# Patient Record
Sex: FEMALE | Race: WHITE | Employment: UNEMPLOYED | URBAN - METROPOLITAN AREA
[De-identification: names, ages, dates, MRNs, and addresses within clinical notes are randomized per-mention and may not be internally consistent; named-entity substitution may affect disease eponyms.]

---

## 2024-04-02 ENCOUNTER — OFFICE VISIT (OUTPATIENT)
Dept: URGENT CARE | Facility: CLINIC | Age: 39
End: 2024-04-02
Payer: COMMERCIAL

## 2024-04-02 VITALS
RESPIRATION RATE: 16 BRPM | HEART RATE: 80 BPM | DIASTOLIC BLOOD PRESSURE: 60 MMHG | OXYGEN SATURATION: 98 % | SYSTOLIC BLOOD PRESSURE: 112 MMHG | TEMPERATURE: 97.9 F | HEIGHT: 60 IN

## 2024-04-02 DIAGNOSIS — Z02.4 DRIVER'S PERMIT PHYSICAL EXAMINATION: Primary | ICD-10-CM

## 2024-04-02 NOTE — PROGRESS NOTES
[facility]        NAME: Celina Dixon is a 38 y.o. female  : 1985    MRN: [id number]  DATE: 2024  TIME: 2:16 PM    Assessment and Plan   's permit physical examination [Z02.4]  1. 's permit physical examination        It is my medical option that patient has no history of or evidence of chronic illnesses that would limit their ability to drive a non-commercial vehicle safely.   Patient is instructed that they must wear corrective lenses whenever driving as they were worn for their exam today.   [employer] paperwork was filled out to this effect and originals were returned to the patient.           Patient Instructions   Drive Safely and Carefully and Wear your glasses!    Chief Complaint     Chief Complaint   Patient presents with    divers permit     Annual exam         History of Present Illness       38 year old female presents for  licensing physical. Pt reports they have never driven in the [facility] or any other state. Pt reports they are healthy and denies history of neurologic, neuropsychiatric, and cardiac disease. Pt denies further history of hypertension, uncontrolled epilepsy, uncontrolled diabetes, lapses in consciousness, cognitive impairments, alcohol abuse, and illicit drug use. Pt reports they have all appendages and denies use of prosthetic devices.          Review of Systems   Review of Systems   Neurological:  Negative for dizziness, seizures, syncope, weakness, light-headedness, numbness and headaches.         Current Medications     No current outpatient medications on file.    Current Allergies     Allergies as of 2024    (No Known Allergies)            The following portions of the patient's history were reviewed and updated as appropriate: allergies, current medications, past family history, past medical history, past social history, past surgical history and problem list.     History reviewed. No pertinent past medical  "history.    No past surgical history on file.    No family history on file.      Medications have been verified.        Objective   /60   Pulse 80   Temp 97.9 °F (36.6 °C)   Resp 16   Ht 5' 0.24\" (1.53 m)   SpO2 98%   No LMP recorded.       Physical Exam     Physical Exam  Vitals and nursing note reviewed.   Constitutional:       General: She is awake. She is not in acute distress.     Appearance: Normal appearance. She is well-developed and well-groomed. She is not ill-appearing, toxic-appearing or diaphoretic.   HENT:      Head: Normocephalic and atraumatic.      Right Ear: Hearing, tympanic membrane, ear canal and external ear normal.      Left Ear: Hearing, tympanic membrane, ear canal and external ear normal.      Nose: Nose normal.      Mouth/Throat:      Lips: Pink. No lesions.      Mouth: Mucous membranes are moist. No injury, lacerations or oral lesions.      Dentition: Normal dentition. No dental tenderness, gingival swelling, dental caries, dental abscesses or gum lesions.      Tongue: No lesions. Tongue does not deviate from midline.      Palate: No mass and lesions.      Pharynx: No pharyngeal swelling, oropharyngeal exudate, posterior oropharyngeal erythema or uvula swelling.      Tonsils: No tonsillar exudate.   Eyes:      General: Lids are normal. Vision grossly intact. Gaze aligned appropriately. No visual field deficit or scleral icterus.        Right eye: No foreign body, discharge or hordeolum.         Left eye: No foreign body, discharge or hordeolum.      Extraocular Movements: Extraocular movements intact.      Conjunctiva/sclera: Conjunctivae normal.      Pupils: Pupils are equal, round, and reactive to light. Pupils are equal.      Right eye: Pupil is round, reactive and not sluggish.      Left eye: Pupil is round, reactive and not sluggish.      Funduscopic exam:     Right eye: No hemorrhage, exudate, AV nicking, arteriolar narrowing or papilledema. Red reflex and venous " pulsations present.         Left eye: No hemorrhage, exudate, AV nicking, arteriolar narrowing or papilledema. Red reflex and venous pulsations present.     Visual Fields: Right eye visual fields normal and left eye visual fields normal.      Comments: Left eye vision:20/20  Right eye vision:20/20  Both eye vision: 20/15  CORRECTED    Neck:      Thyroid: No thyroid mass or thyromegaly.      Vascular: No carotid bruit.   Cardiovascular:      Rate and Rhythm: Normal rate and regular rhythm.      Pulses:           Carotid pulses are 2+ on the right side and 2+ on the left side.       Radial pulses are 2+ on the right side and 2+ on the left side.        Posterior tibial pulses are 2+ on the right side and 2+ on the left side.      Heart sounds: S1 normal and S2 normal. Heart sounds not distant. No murmur heard.     No friction rub. No gallop.   Pulmonary:      Effort: Pulmonary effort is normal.      Breath sounds: Normal breath sounds and air entry. No stridor, decreased air movement or transmitted upper airway sounds. No decreased breath sounds, wheezing, rhonchi or rales.   Abdominal:      General: Abdomen is flat. Bowel sounds are normal.      Palpations: Abdomen is soft.      Tenderness: There is no abdominal tenderness.   Musculoskeletal:      Cervical back: Normal range of motion and neck supple.      Right lower leg: No edema.      Left lower leg: No edema.      Right foot: Normal range of motion.      Left foot: Normal range of motion.      Comments: Pt has full ROM about all UE and LE joints.  Strength is 5/5 BUE and BLE   Lymphadenopathy:      Cervical: No cervical adenopathy.   Skin:     General: Skin is warm and dry.   Neurological:      General: No focal deficit present.      Mental Status: She is alert and oriented to person, place, and time.      Sensory: Sensation is intact.      Motor: Motor function is intact.      Coordination: Coordination is intact.      Gait: Gait is intact.      Deep Tendon  "Reflexes: Reflexes are normal and symmetric.   Psychiatric:         Attention and Perception: Attention and perception normal.         Mood and Affect: Mood and affect normal.         Speech: Speech normal.         Behavior: Behavior normal. Behavior is cooperative.         Thought Content: Thought content normal.         Judgment: Judgment normal.               Note: Portions of this record may have been created with voice recognition software. Occasional wrong word or \"sound a like\" substitutions may have occurred due to the inherent limitations of voice recognition software. Please read the chart carefully and recognize, using context, where substitutions have occurred.*      "

## 2024-09-25 NOTE — PROGRESS NOTES
Assessment/Plan:  Problem List Items Addressed This Visit          Cardiovascular and Mediastinum    Migraines     Uncontrolled.  Start Prozac 20mg QD and Compazine 10mg 1/2-1 tab q6 hours PRN.      For headache and migraine prevention I would suggest a combination vitamin supplement which may include 1 or more of the following ingredients: Magnesium citrate 300 mg twice a day, Riboflavin (vitamin B2) 400 - 600 mg,  Butterbur 150 mg (PA free). Other ingredients that may be helpful are feverfew, coenzyme Q10 100 mg three times a day,  melatonin and kelly.  Some example brands that combine some of these ingredients are Migravent or Dolovent.              Relevant Medications    FLUoxetine (PROzac) 10 mg capsule    prochlorperazine (COMPAZINE) 10 mg tablet    Other Relevant Orders    Magnesium       Behavioral Health    Adjustment disorder with depressed mood     Stable.  Start Prozac 20mg QD.         Relevant Medications    FLUoxetine (PROzac) 10 mg capsule    prochlorperazine (COMPAZINE) 10 mg tablet       Other    Overweight     Recommend lifestyle modifications.           Other Visit Diagnoses       Annual physical exam    -  Primary    Health Maintenance   Topic Date Due    Hepatitis C Screening  Never done    HIV Screening  Never done    Annual Physical  Never done    Cervical Cancer Screening  Never done    Influenza Vaccine (1) 09/01/2024    COVID-19 Vaccine (1 - 2023-24 season) 12/26/2024 (Originally 9/1/2024)    DTaP,Tdap,and Td Vaccines (1 - Tdap) 09/26/2025 (Originally 6/15/2007)    Depression Screening  09/26/2025    Zoster Vaccine (1 of 2) 06/15/2036    RSV Vaccine Age 60+ Years (1 - 1-dose 60+ series) 06/15/2046    RSV Vaccine age 0-20 Months  Aged Out    Pneumococcal Vaccine: Pediatrics (0 to 5 Years) and At-Risk Patients (6 to 64 Years)  Aged Out    HIB Vaccine  Aged Out    IPV Vaccine  Aged Out    Hepatitis A Vaccine  Aged Out    Meningococcal ACWY Vaccine  Aged Out    HPV Vaccine  Aged Out          Multiple nevi        Relevant Orders    Ambulatory Referral to Dermatology    Screening for cervical cancer        Relevant Orders    Ambulatory Referral to Obstetrics / Gynecology    Screening for cardiovascular condition        Relevant Orders    CBC and differential    Comprehensive metabolic panel    Lipid panel    LDL cholesterol, direct    Screening for HIV (human immunodeficiency virus)        Relevant Orders    HIV 1/2 AB/AG w Reflex [facility] for 2 yr old and above    Need for hepatitis C screening test        Relevant Orders    Hepatitis C antibody    BMI 26.0-26.9,adult        Skin cancer screening        Relevant Orders    Ambulatory Referral to Dermatology    Papules        Relevant Orders    Ambulatory Referral to Dermatology    Encounter for initial prescription of contraceptive pills        Relevant Orders    Ambulatory Referral to Obstetrics / Gynecology    Other fatigue        Relevant Orders    CBC and differential    Comprehensive metabolic panel    TSH, 3rd generation with Free T4 reflex    Magnesium    Myalgia        Relevant Orders    Vitamin D 25 hydroxy             Return in about 6 weeks (around 11/7/2024) for 40min Sp Int - F/U Migraine, Adjust D/O, Labs.      Future Appointments   Date Time Provider Department Center   11/12/2024  3:00 PM Hanane Dunn DO [facility]          Subjective:     Celina is a 39 y.o. female who presents today as a new patient for her medical conditions.      New Patient    Previous PCP:  None  Reason for Transfer:  [place] in [place]  Last seen by previous PCP:  6/2022 in [place]  Last Labs:  None  Last Physical:  Years  Medical Records Requested:   No      HPI:  Chief Complaint   Patient presents with    New Patient Visit     Here to establish care and address concerns regarding migraines and some moles on her legs.     Headache     Pt c/o frequent migraines, approx 3x week. Experiencing n/v, light and sound sensitivity. Pt uses excedrin  migraine with temporary relief of headache. Tx for migraines in the past in [facility], but it has been a few years.     HM     Declines Tdap today, unknown last dose. Last pap was approx 3 years ago, in [facility].      -- Above per clinical staff and reviewed. --      HPI      Today:      Medical  [id number] ([id number]) - disconnected.  2nd  used - Renae Key ([id number])    Controlled Substance Review    [place] PDMP or [place]  reviewed: No red flags were identified; safe to proceed with prescription.    Overweight - Trying to watch diet.  No exercise.      Migraine s aura - Symptoms x years, worsening in the past 3 years.  B/L frontal, temporal, and crown.  Occurs 3 times per weekly.  Sometimes migraine last 1-3 days.  She has 7 migraine days per month.  +Nausea, vomiting, photophobia, phonophobia.  Using Excedrin migraine 3 days per week PRN.  Previously on daily unknown Rx from [place] years ago.  No Neuro consult or brain imaging previously.      Nevi - Symptoms x 1 year, changing in size and color.  On legs.  +Itching.  Denies  bleeding, scabbing.      Adjustment Disorder c Depression - Symptoms x 2 months.  Feels mood is stable.  Feels tired.  Good social supports.  No SI/HI/AH/VH.  No mood meds or counseling previously.      PHQ-2/9 Depression Screening    Little interest or pleasure in doing things: 1 - several days  Feeling down, depressed, or hopeless: 1 - several days  Trouble falling or staying asleep, or sleeping too much: 2 - more than half the days  Feeling tired or having little energy: 2 - more than half the days  Poor appetite or overeatin - not at all  Feeling bad about yourself - or that you are a failure or have let yourself or your family down: 0 - not at all  Trouble concentrating on things, such as reading the newspaper or watching television: 0 - not at all  Moving or speaking so slowly that other people could have noticed. Or the opposite - being so fidgety or restless  "that you have been moving around a lot more than usual: 0 - not at all  Thoughts that you would be better off dead, or of hurting yourself in some way: 0 - not at all  PHQ-2 Score: 2  PHQ-2 Interpretation: Negative depression screen  PHQ-9 Score: 6  PHQ-9 Interpretation: Mild depression             Reviewed:  Labs - None    No Gyn.  She is taking Grisel OCP from [place], but it causes nausea and vomiting.    Sees Dentist q6 months.  Sees Optho q2 years.            The following portions of the patient's history were reviewed and updated as appropriate: allergies, current medications, past family history, past medical history, past social history, past surgical history and problem list.      Review of Systems   Constitutional:  Positive for fatigue. Negative for appetite change, chills, diaphoresis and fever.   Respiratory:  Negative for chest tightness and shortness of breath.    Cardiovascular:  Negative for chest pain.   Gastrointestinal:  Negative for abdominal pain, blood in stool, diarrhea, nausea and vomiting.   Genitourinary:  Negative for dysuria.        Current Outpatient Medications   Medication Sig Dispense Refill    drospirenone-ethinyl estradiol (GRISEL) 3-0.03 MG per tablet Take 1 tablet by mouth daily Rx per [place]      FLUoxetine (PROzac) 10 mg capsule 1 tab by mouth daily in AM x 1 week, then increase to 2 tabs by mouth daily in AM. 60 capsule 1    prochlorperazine (COMPAZINE) 10 mg tablet Take 0.5-1 tablets (5-10 mg total) by mouth every 6 (six) hours as needed for nausea or vomiting (Migraine) 30 tablet 0     No current facility-administered medications for this visit.       Objective:  /62   Pulse 84   Temp 97.6 °F (36.4 °C)   Resp 14   Ht 5' 0.25\" (1.53 m)   Wt 61.1 kg (134 lb 9.6 oz)   LMP 09/05/2024 (Exact Date)   SpO2 99%   Breastfeeding No   BMI 26.07 kg/m²    Wt Readings from Last 3 Encounters:   09/26/24 61.1 kg (134 lb 9.6 oz)      BP Readings from Last 3 Encounters: "   09/26/24 104/62   04/02/24 112/60          Physical Exam  Vitals and nursing note reviewed.   Constitutional:       Appearance: Normal appearance. She is well-developed.   HENT:      Head: Normocephalic and atraumatic.      Right Ear: Tympanic membrane, ear canal and external ear normal.      Left Ear: Tympanic membrane, ear canal and external ear normal.      Nose: Nose normal.      Right Sinus: No maxillary sinus tenderness or frontal sinus tenderness.      Left Sinus: No maxillary sinus tenderness or frontal sinus tenderness.      Mouth/Throat:      Mouth: Mucous membranes are moist.      Pharynx: Oropharynx is clear. Uvula midline.      Tonsils: No tonsillar exudate.   Eyes:      Extraocular Movements: Extraocular movements intact.      Conjunctiva/sclera: Conjunctivae normal.      Pupils: Pupils are equal, round, and reactive to light.   Cardiovascular:      Rate and Rhythm: Normal rate and regular rhythm.      Pulses: Normal pulses.      Heart sounds: Normal heart sounds.   Pulmonary:      Effort: Pulmonary effort is normal.      Breath sounds: Normal breath sounds.   Abdominal:      General: Bowel sounds are normal. There is no distension.      Palpations: Abdomen is soft. There is no mass.      Tenderness: There is no abdominal tenderness. There is no guarding or rebound.   Musculoskeletal:         General: No swelling or tenderness.      Cervical back: Neck supple.      Right lower leg: No edema.      Left lower leg: No edema.   Lymphadenopathy:      Cervical: No cervical adenopathy.   Skin:     Findings: No rash.      Comments: Multiple benign-appearing nevi and papules on body   Neurological:      General: No focal deficit present.      Mental Status: She is alert and oriented to person, place, and time.      Cranial Nerves: No cranial nerve deficit.      Sensory: No sensory deficit.      Motor: No weakness.      Coordination: Coordination normal.      Gait: Gait normal.      Deep Tendon Reflexes:  "Reflexes normal.   Psychiatric:         Mood and Affect: Mood normal.         Behavior: Behavior normal.         Thought Content: Thought content normal.         Judgment: Judgment normal.         Lab Results:      No results found for: \"WBC\", \"HGB\", \"HCT\", \"PLT\", \"CHOL\", \"TRIG\", \"HDL\", \"LDLDIRECT\", \"ALT\", \"AST\", \"NA\", \"K\", \"CL\", \"CREATININE\", \"BUN\", \"CO2\", \"TSH\", \"PSA\", \"INR\", \"GLUF\", \"HGBA1C\", \"MICROALBUR\"  No results found for: \"URICACID\"  Invalid input(s): \"BASENAME\" Vitamin D    No results found.     POCT Labs        Depression Screening and Follow-up Plan: Patient was screened for depression during today's encounter. They screened negative with a PHQ-2 score of 2.                5 minutes spent on chart prep, 55 minutes spent with patient counseling/educating on their diagnoses, tests completed and any new tests ordered, any referrals placed, treatment options, and documentation of above today.   In prescribing new medications, or changing doses, we reviewed the risks and benefits and side effects of these medications along with other treatment options if appropriate.       "

## 2024-09-26 ENCOUNTER — OFFICE VISIT (OUTPATIENT)
Dept: FAMILY MEDICINE CLINIC | Facility: CLINIC | Age: 39
End: 2024-09-26
Payer: COMMERCIAL

## 2024-09-26 VITALS
SYSTOLIC BLOOD PRESSURE: 104 MMHG | TEMPERATURE: 97.6 F | RESPIRATION RATE: 14 BRPM | WEIGHT: 134.6 LBS | BODY MASS INDEX: 26.42 KG/M2 | HEART RATE: 84 BPM | HEIGHT: 60 IN | OXYGEN SATURATION: 99 % | DIASTOLIC BLOOD PRESSURE: 62 MMHG

## 2024-09-26 DIAGNOSIS — G43.009 MIGRAINE WITHOUT AURA AND WITHOUT STATUS MIGRAINOSUS, NOT INTRACTABLE: ICD-10-CM

## 2024-09-26 DIAGNOSIS — Z13.6 SCREENING FOR CARDIOVASCULAR CONDITION: ICD-10-CM

## 2024-09-26 DIAGNOSIS — Z12.83 SKIN CANCER SCREENING: ICD-10-CM

## 2024-09-26 DIAGNOSIS — Z12.4 SCREENING FOR CERVICAL CANCER: ICD-10-CM

## 2024-09-26 DIAGNOSIS — R53.83 OTHER FATIGUE: ICD-10-CM

## 2024-09-26 DIAGNOSIS — D22.9 MULTIPLE NEVI: ICD-10-CM

## 2024-09-26 DIAGNOSIS — M79.10 MYALGIA: ICD-10-CM

## 2024-09-26 DIAGNOSIS — F43.21 ADJUSTMENT DISORDER WITH DEPRESSED MOOD: ICD-10-CM

## 2024-09-26 DIAGNOSIS — R23.8 PAPULES: ICD-10-CM

## 2024-09-26 DIAGNOSIS — E66.3 OVERWEIGHT: ICD-10-CM

## 2024-09-26 DIAGNOSIS — Z11.59 NEED FOR HEPATITIS C SCREENING TEST: ICD-10-CM

## 2024-09-26 DIAGNOSIS — Z11.4 SCREENING FOR HIV (HUMAN IMMUNODEFICIENCY VIRUS): ICD-10-CM

## 2024-09-26 DIAGNOSIS — Z30.011 ENCOUNTER FOR INITIAL PRESCRIPTION OF CONTRACEPTIVE PILLS: ICD-10-CM

## 2024-09-26 DIAGNOSIS — Z00.00 ANNUAL PHYSICAL EXAM: Primary | ICD-10-CM

## 2024-09-26 PROCEDURE — 99385 PREV VISIT NEW AGE 18-39: CPT | Performed by: FAMILY MEDICINE

## 2024-09-26 PROCEDURE — 99205 OFFICE O/P NEW HI 60 MIN: CPT | Performed by: FAMILY MEDICINE

## 2024-09-26 RX ORDER — DROSPIRENONE AND ETHINYL ESTRADIOL 0.03MG-3MG
1 KIT ORAL DAILY
COMMUNITY

## 2024-09-26 RX ORDER — FLUOXETINE 10 MG/1
CAPSULE ORAL
Qty: 60 CAPSULE | Refills: 1 | Status: SHIPPED | OUTPATIENT
Start: 2024-09-26

## 2024-09-26 RX ORDER — PROCHLORPERAZINE MALEATE 10 MG
5-10 TABLET ORAL EVERY 6 HOURS PRN
Qty: 30 TABLET | Refills: 0 | Status: SHIPPED | OUTPATIENT
Start: 2024-09-26

## 2024-09-26 NOTE — ASSESSMENT & PLAN NOTE
Uncontrolled.  Start Prozac 20mg QD and Compazine 10mg 1/2-1 tab q6 hours PRN.      For headache and migraine prevention I would suggest a combination vitamin supplement which may include 1 or more of the following ingredients: Magnesium citrate 300 mg twice a day, Riboflavin (vitamin B2) 400 - 600 mg,  Butterbur 150 mg ([place] free). Other ingredients that may be helpful are feverfew, coenzyme Q10 100 mg three times a day,  melatonin and kelly.  Some example brands that combine some of these ingredients are Migravent or Dolovent.

## 2024-09-26 NOTE — PATIENT INSTRUCTIONS
"  Please contact your insurance if you are uncertain of coverage for plan of care items.    Your insurance may not cover the cost of your Vitamin D blood test, which is approximately $65-70.  Please notify the lab prior to blood draw if you would like to decline this test.      For headache and migraine prevention I would suggest a combination vitamin supplement which may include 1 or more of the following ingredients: Magnesium citrate 300 mg twice a day, Riboflavin (vitamin B2) 400 - 600 mg,  Butterbur 150 mg (PA free). Other ingredients that may be helpful are feverfew, coenzyme Q10 100 mg three times a day,  melatonin and kelly.  Some example brands that combine some of these ingredients are Migravent or Dolovent.       Patient Education     Routine physical for adults   The Basics   Written by the doctors and editors at [facility]   What is a physical? -- A physical is a routine visit, or \"check-up,\" with your doctor. You might also hear it called a \"wellness visit\" or \"preventive visit.\"  During each visit, the doctor will:   Ask about your physical and mental health   Ask about your habits, behaviors, and lifestyle   Do an exam   Give you vaccines if needed   Talk to you about any medicines you take   Give advice about your health   Answer your questions  Getting regular check-ups is an important part of taking care of your health. It can help your doctor find and treat any problems you have. But it's also important for preventing health problems.  A routine physical is different from a \"sick visit.\" A sick visit is when you see a doctor because of a health concern or problem. Since physicals are scheduled ahead of time, you can think about what you want to ask the doctor.  How often should I get a physical? -- It depends on your age and health. In general, for people age 21 years and older:   If you are younger than 50 years, you might be able to get a physical every 3 years.   If you are 50 years or older, " "your doctor might recommend a physical every year.  If you have an ongoing health condition, like diabetes or high blood pressure, your doctor will probably want to see you more often.  What happens during a physical? -- In general, each visit will include:   Physical exam - The doctor or nurse will check your height, weight, heart rate, and blood pressure. They will also look at your eyes and ears. They will ask about how you are feeling and whether you have any symptoms that bother you.   Medicines - It's a good idea to bring a list of all the medicines you take to each doctor visit. Your doctor will talk to you about your medicines and answer any questions. Tell them if you are having any side effects that bother you. You should also tell them if you are having trouble paying for any of your medicines.   Habits and behaviors - This includes:   Your diet   Your exercise habits   Whether you smoke, drink alcohol, or use drugs   Whether you are sexually active   Whether you feel safe at home  Your doctor will talk to you about things you can do to improve your health and lower your risk of health problems. They will also offer help and support. For example, if you want to quit smoking, they can give you advice and might prescribe medicines. If you want to improve your diet or get more physical activity, they can help you with this, too.   Lab tests, if needed - The tests you get will depend on your age and situation. For example, your doctor might want to check your:   Cholesterol   Blood sugar   Iron level   Vaccines - The recommended vaccines will depend on your age, health, and what vaccines you already had. Vaccines are very important because they can prevent certain serious or deadly infections.   Discussion of screening - \"Screening\" means checking for diseases or other health problems before they cause symptoms. Your doctor can recommend screening based on your age, risk, and preferences. This might include " tests to check for:   Cancer, such as breast, prostate, cervical, ovarian, colorectal, prostate, lung, or skin cancer   Sexually transmitted infections, such as chlamydia and gonorrhea   Mental health conditions like depression and anxiety  Your doctor will talk to you about the different types of screening tests. They can help you decide which screenings to have. They can also explain what the results might mean.   Answering questions - The physical is a good time to ask the doctor or nurse questions about your health. If needed, they can refer you to other doctors or specialists, too.  Adults older than 65 years often need other care, too. As you get older, your doctor will talk to you about:   How to prevent falling at home   Hearing or vision tests   Memory testing   How to take your medicines safely   Making sure that you have the help and support you need at home  All topics are updated as new evidence becomes available and our peer review process is complete.  This topic retrieved from [employer] on: May 02, 2024.  Topic 260525 Version 1.0  Release: 32.4.3 - C32.122  © 2024 UpToDate, Inc. and/or its affiliates. All rights reserved.  Consumer Information Use and Disclaimer   Disclaimer: This generalized information is a limited summary of diagnosis, treatment, and/or medication information. It is not meant to be comprehensive and should be used as a tool to help the user understand and/or assess potential diagnostic and treatment options. It does NOT include all information about conditions, treatments, medications, side effects, or risks that may apply to a specific patient. It is not intended to be medical advice or a substitute for the medical advice, diagnosis, or treatment of a health care provider based on the health care provider's examination and assessment of a patient's specific and unique circumstances. Patients must speak with a health care provider for complete information about their health,  "medical questions, and treatment options, including any risks or benefits regarding use of medications. This information does not endorse any treatments or medications as safe, effective, or approved for treating a specific patient. UpToDate, Inc. and its affiliates disclaim any warranty or liability relating to this information or the use thereof.The use of this information is governed by the Terms of Use, available at https://www.Infogramuwer.com/en/know/clinical-effectiveness-terms. 2024© UpToDate, Inc. and its affiliates and/or licensors. All rights reserved.  Copyright   © 2024 UpToDate, Inc. and/or its affiliates. All rights reserved.    Patient Education     High cholesterol   The Basics   Written by the doctors and editors at [employer]   What is cholesterol? -- Cholesterol is a substance found in blood. Everyone has some. It is needed for good health. But people sometimes have too much cholesterol.  Compared with people with normal cholesterol, people with high cholesterol have a higher risk of heart attack, stroke, and other health problems. The higher your cholesterol, the higher your risk of these problems.  Are there different types of cholesterol? -- Yes, there are a few different types. If you get a cholesterol test, you might hear your doctor or nurse talk about:   Total cholesterol   LDL cholesterol - Some people call this the \"bad\" cholesterol. That's because having high LDL levels raises your risk of heart attack, stroke, and other health problems.   HDL cholesterol - Some people call this the \"good\" cholesterol. That's because people with high HDL levels tend to have a lower risk of heart attack, stroke, and other health problems.   Non-HDL cholesterol - Non-HDL cholesterol is your total cholesterol minus your HDL cholesterol.   Triglycerides - Triglycerides are not cholesterol. They are another type of fat. But they often get measured when cholesterol is measured. (Having high triglycerides also " "seems to increase the risk of heart attack and stroke.)  What should my numbers be? -- Ask your doctor or nurse what your numbers should be. Different people need different goals. If you live outside of the [place], see the table (table 1).  In general, people who do not already have heart disease should aim for:   Total cholesterol below 200   LDL cholesterol below 130, or much lower if they are at risk of heart attack or stroke   HDL cholesterol above 60   Non-HDL cholesterol below 160, or lower if they are at risk of heart attack or stroke   Triglycerides below 150  Remember, though, that many people who cannot meet these goals still have a low risk of heart attack and stroke.  What should I do if I have high cholesterol? -- Ask your doctor what your overall risk of heart attack and stroke is. Just having high cholesterol is not always a reason to worry. Having high cholesterol is just one of many things that can increase your risk of heart attack and stroke.  Other things that increase your risk include:   Smoking   High blood pressure   Having a parent or sibling who got heart disease at a young age - Young, in this case, means younger than 55 for males and younger than 65 for females.   A diet that is not heart healthy - A \"heart-healthy\" diet includes lots of fruits and vegetables, fiber, and healthy fats (like those found in fish, nuts, and certain oils). It also means limiting sugar and unhealthy fats.   Older age  If you are at high risk of heart attack and stroke, having high cholesterol is a problem. But if you are at low risk, high cholesterol might not need treatment.  Should I take medicine to lower cholesterol? -- Not everyone who has high cholesterol needs medicines. Your doctor or nurse will decide if you need them based on your age, family history, and other health concerns.  There are many different medicines used to lower cholesterol (table 2). Some help your body make less cholesterol. Some keep " "your body from absorbing cholesterol from foods. Some help your body get rid of cholesterol faster. The medicines most often used to treat high cholesterol are called \"statins.\"  You should probably take a statin if you:   Already had a heart attack or stroke   Have known heart disease   Have diabetes   Have a condition called \"peripheral artery disease,\" which makes it painful to walk, and happens when the arteries in your legs get clogged with fatty deposits   Have an \"abdominal aortic aneurysm,\" which is a widening of the main artery in the belly  Most people with any of the conditions listed above should take a statin no matter what their cholesterol level is. If your doctor or nurse prescribes a statin, it's important to keep taking it. The medicine might not make you feel any different. But it can help prevent heart attack, stroke, and death.  If your doctor or nurse recommends taking medicine to help lower your cholesterol, make sure that you know what it is called. Follow all the instructions for how to take it. For example, some medicines work better when you take them in the evening. Some need to be taken with food.  Tell your doctor or nurse if your medicine causes any side effects that bother you. They might be able to switch you to a different medicine.  Can I lower my cholesterol without medicines? -- Yes. You can help lower your cholesterol by doing these things:   You can lower your LDL, or \"bad,\" cholesterol by avoiding red meat, butter, fried foods, cheese, and other foods that have a lot of saturated fat.   You can lower triglycerides by avoiding sugary foods, fried foods, and excess alcohol.   If you have excess weight, it can help to lose weight. Your doctor or nurse can help you do this in a healthy way.   Try to get regular physical activity. Even gentle forms of exercise, like walking, are good for your health.  Even if these steps don't change your cholesterol very much, they can improve " your health in many other ways.  All topics are updated as new evidence becomes available and our peer review process is complete.  This topic retrieved from [employer] on: Mar 01, 2024.  Topic 83604 Version 25.0  Release: 32.2.4 - C32.59  © 2024 UpToDate, Inc. and/or its affiliates. All rights reserved.  table 1: Cholesterol and triglyceride measurements in the [place] and elsewhere     Measurement used within the [place] Milligrams/deciliter (mg/dL)  Measurement used most places outside of the [place] Millimoles/liter (mmol/Liter)     Level to aim for  Level to aim for    Total cholesterol  Below 200 Below 5.17   LDL cholesterol  Below 130, or much lower if at risk of heart attack and stroke Below 3.36, or much lower if at risk of heart attack and stroke   HDL cholesterol  Above 60 Above 1.55   Triglycerides  Below 150 Below 1.7   Cholesterol is measured differently in the [place] than it is in most other countries. This table shows values used within and outside of the [place]. It includes the cholesterol and triglyceride levels that most people who do not have heart disease should aim for.  Graphic 04746 Version 5.0  table 2: Lipid-lowering medicines  Generic name  Brand name    Statins    Atorvastatin Lipitor   Fluvastatin Lescol, Lescol XL   Lovastatin Mevacor, Altoprev   Pitavastatin Livalo   Pravastatin Pravachol   Rosuvastatin Crestor   Simvastatin Zocor   PCSK9 inhibitors    Alirocumab Praluent   Evolocumab Repatha, Repatha SureClick   Cholesterol absorption inhibitors    Ezetimibe Zetia   Bile acid sequestrants    Cholestyramine Prevalite, Questran, Questran Light   Colesevelam Welchol   Colestipol Colestid   Niacin (nicotinic acid)    Niacin immediate release     Niacin extended release Niaspan   Fibrates    Fenofibrate Fenoglide, Tricor, Triglide, others   Gemfibrozil Lopid   Brand names listed are for medicines available in the [place] and some other countries.  Graphic 83751 Version 7.0  Consumer Information Use and Disclaimer    Disclaimer: This generalized information is a limited summary of diagnosis, treatment, and/or medication information. It is not meant to be comprehensive and should be used as a tool to help the user understand and/or assess potential diagnostic and treatment options. It does NOT include all information about conditions, treatments, medications, side effects, or risks that may apply to a specific patient. It is not intended to be medical advice or a substitute for the medical advice, diagnosis, or treatment of a health care provider based on the health care provider's examination and assessment of a patient's specific and unique circumstances. Patients must speak with a health care provider for complete information about their health, medical questions, and treatment options, including any risks or benefits regarding use of medications. This information does not endorse any treatments or medications as safe, effective, or approved for treating a specific patient. UpToDate, Inc. and its affiliates disclaim any warranty or liability relating to this information or the use thereof.The use of this information is governed by the Terms of Use, available at https://www.woltersWild Pocketsuwer.com/en/know/clinical-effectiveness-terms. 2024© UpToDate, Inc. and its affiliates and/or licensors. All rights reserved.  Copyright   © 2024 UpToDate, Inc. and/or its affiliates. All rights reserved.

## 2024-10-08 ENCOUNTER — TELEPHONE (OUTPATIENT)
Age: 39
End: 2024-10-08

## 2024-10-08 NOTE — TELEPHONE ENCOUNTER
Received call from patient wanting to schedule a New Patient appointment. Spoke to patient via the  language line.     Scheduled New Patient Appt on 12/11/24 at 7:40 am with Dr. Lemos in [place]. Gave [place] address.     Patient verbalized understanding.

## 2024-10-27 LAB
25(OH)D3+25(OH)D2 SERPL-MCNC: 20.3 NG/ML (ref 30–100)
ALBUMIN SERPL-MCNC: 4.2 G/DL (ref 3.9–4.9)
ALP SERPL-CCNC: 75 IU/L (ref 44–121)
ALT SERPL-CCNC: 11 IU/L (ref 0–32)
AST SERPL-CCNC: 11 IU/L (ref 0–40)
BASOPHILS # BLD AUTO: 0.1 X10E3/UL (ref 0–0.2)
BASOPHILS NFR BLD AUTO: 1 %
BILIRUB SERPL-MCNC: 0.5 MG/DL (ref 0–1.2)
BUN SERPL-MCNC: 11 MG/DL (ref 6–20)
BUN/CREAT SERPL: 17 (ref 9–23)
CALCIUM SERPL-MCNC: 9.1 MG/DL (ref 8.7–10.2)
CHLORIDE SERPL-SCNC: 104 MMOL/L (ref 96–106)
CHOLEST SERPL-MCNC: 139 MG/DL (ref 100–199)
CO2 SERPL-SCNC: 23 MMOL/L (ref 20–29)
CREAT SERPL-MCNC: 0.66 MG/DL (ref 0.57–1)
EGFR: 115 ML/MIN/1.73
EOSINOPHIL # BLD AUTO: 0 X10E3/UL (ref 0–0.4)
EOSINOPHIL NFR BLD AUTO: 1 %
ERYTHROCYTE [DISTWIDTH] IN BLOOD BY AUTOMATED COUNT: 12.6 % (ref 11.7–15.4)
GLOBULIN SER-MCNC: 2.6 G/DL (ref 1.5–4.5)
GLUCOSE SERPL-MCNC: 87 MG/DL (ref 70–99)
HCT VFR BLD AUTO: 40.5 % (ref 34–46.6)
HCV AB S/CO SERPL IA: NON REACTIVE
HDLC SERPL-MCNC: 50 MG/DL
HGB BLD-MCNC: 12.7 G/DL (ref 11.1–15.9)
HIV 1+2 AB+HIV1 P24 AG SERPL QL IA: NON REACTIVE
IMM GRANULOCYTES # BLD: 0 X10E3/UL (ref 0–0.1)
IMM GRANULOCYTES NFR BLD: 0 %
LDL CALC COMMENT: NORMAL
LDLC SERPL CALC-MCNC: 76 MG/DL (ref 0–99)
LDLC SERPL DIRECT ASSAY-MCNC: 73 MG/DL (ref 0–99)
LYMPHOCYTES # BLD AUTO: 1.7 X10E3/UL (ref 0.7–3.1)
LYMPHOCYTES NFR BLD AUTO: 35 %
MAGNESIUM SERPL-MCNC: 2.1 MG/DL (ref 1.6–2.3)
MCH RBC QN AUTO: 27.4 PG (ref 26.6–33)
MCHC RBC AUTO-ENTMCNC: 31.4 G/DL (ref 31.5–35.7)
MCV RBC AUTO: 87 FL (ref 79–97)
MONOCYTES # BLD AUTO: 0.4 X10E3/UL (ref 0.1–0.9)
MONOCYTES NFR BLD AUTO: 8 %
NEUTROPHILS # BLD AUTO: 2.8 X10E3/UL (ref 1.4–7)
NEUTROPHILS NFR BLD AUTO: 55 %
PLATELET # BLD AUTO: 208 X10E3/UL (ref 150–450)
POTASSIUM SERPL-SCNC: 4 MMOL/L (ref 3.5–5.2)
PROT SERPL-MCNC: 6.8 G/DL (ref 6–8.5)
RBC # BLD AUTO: 4.64 X10E6/UL (ref 3.77–5.28)
SL AMB VLDL CHOLESTEROL CALC: 13 MG/DL (ref 5–40)
SODIUM SERPL-SCNC: 139 MMOL/L (ref 134–144)
TRIGL SERPL-MCNC: 61 MG/DL (ref 0–149)
TSH SERPL DL<=0.005 MIU/L-ACNC: 0.88 UIU/ML (ref 0.45–4.5)
WBC # BLD AUTO: 5 X10E3/UL (ref 3.4–10.8)

## 2024-11-12 ENCOUNTER — OFFICE VISIT (OUTPATIENT)
Dept: FAMILY MEDICINE CLINIC | Facility: CLINIC | Age: 39
End: 2024-11-12
Payer: COMMERCIAL

## 2024-11-12 VITALS
BODY MASS INDEX: 26.42 KG/M2 | DIASTOLIC BLOOD PRESSURE: 62 MMHG | HEART RATE: 72 BPM | TEMPERATURE: 97.8 F | RESPIRATION RATE: 14 BRPM | OXYGEN SATURATION: 100 % | WEIGHT: 134.6 LBS | HEIGHT: 60 IN | SYSTOLIC BLOOD PRESSURE: 100 MMHG

## 2024-11-12 DIAGNOSIS — E66.3 OVERWEIGHT: ICD-10-CM

## 2024-11-12 DIAGNOSIS — R79.89 LOW VITAMIN D LEVEL: ICD-10-CM

## 2024-11-12 DIAGNOSIS — G43.009 MIGRAINE WITHOUT AURA AND WITHOUT STATUS MIGRAINOSUS, NOT INTRACTABLE: Primary | ICD-10-CM

## 2024-11-12 DIAGNOSIS — L50.9 HIVES: ICD-10-CM

## 2024-11-12 DIAGNOSIS — Z13.6 SCREENING FOR CARDIOVASCULAR CONDITION: ICD-10-CM

## 2024-11-12 DIAGNOSIS — R22.0 LIP SWELLING: ICD-10-CM

## 2024-11-12 DIAGNOSIS — F43.21 ADJUSTMENT DISORDER WITH DEPRESSED MOOD: ICD-10-CM

## 2024-11-12 PROCEDURE — 99215 OFFICE O/P EST HI 40 MIN: CPT | Performed by: FAMILY MEDICINE

## 2024-11-12 NOTE — ASSESSMENT & PLAN NOTE
Orders:    TSH, 3rd generation with Free T4 reflex; Future    Controlled.  Patient self D/C Prozac 20mg QD due to improvement,

## 2024-11-12 NOTE — PROGRESS NOTES
Assessment/Plan:  Assessment & Plan  Migraine without aura and without status migrainosus, not intractable    Orders:    Magnesium; Future    Controlled.  Patient self D/C Prozac 20mg QD due to improvement, and Compazine 10mg 1/2-1 tab q6 hours PRN due to dizziness.       For headache and migraine prevention I would suggest a combination vitamin supplement which may include 1 or more of the following ingredients: Magnesium citrate 300 mg twice a day, Riboflavin (vitamin B2) 400 - 600 mg,  Butterbur 150 mg (PA free). Other ingredients that may be helpful are feverfew, coenzyme Q10 100 mg three times a day,  melatonin and kelly.  Some example brands that combine some of these ingredients are Migravent or Dolovent.   Adjustment disorder with depressed mood    Orders:    TSH, 3rd generation with Free T4 reflex; Future    Controlled.  Patient self D/C Prozac 20mg QD due to improvement,   Hives    Orders:    Ambulatory Referral to Allergy; Future    Etiology?  Advise Zyrtec 10mg BID.  Use fragrance-free topicals.  Handout given.      Lip swelling    Orders:    Ambulatory Referral to Allergy; Future    Etiology?  Advise Zyrtec 10mg BID.  Handout given.    Overweight      Orders:    TSH, 3rd generation with Free T4 reflex; Future    Stable.  Recommend lifestyle modifications.    Screening for cardiovascular condition    Orders:    CBC and differential; Future    Comprehensive metabolic panel; Future    Lipid panel; Future    LDL cholesterol, direct; Future    Low vitamin D level    Orders:    Comprehensive metabolic panel; Future    Vitamin D 25 hydroxy; Future          Return in 1 year (on 11/12/2025) for 40min Sp Int - Physical - Migraine, Adjust D/O, Labs.      Future Appointments   Date Time Provider Department Center   12/11/2024  7:40 AM Cliff Lemos MD [facility]   12/17/2024  3:15 PM [facility]   11/18/2025  5:20 PM Hanane Dunn DO [facility]         Subjective:     Celina is a 39 y.o. female who presents today for a follow-up on her chronic medical conditions.        HPI:  Chief Complaint   Patient presents with    Follow-up     F/U Migraine, Adjust D/O, Labs. Here to review labs. Having outbreaks of rashes on the back and intermittent swelling of the mouth from allergies. Declines flu shot.      -- Above per clinical staff and reviewed. --      HPI      Today:    Return in about 6 weeks (around 11/7/2024) for 40min Sp Int - F/U Migraine, Adjust D/O, Labs.     Medical  Julieta [id number] used.    Lip swelling (occurred 20 days ago) and hives on back (occurred 8 days ago, and 6 months ago) - She used OTC allergy meds for lip swelling ,which was helpful.  She denies using new topicals or eating new foods.    No Allergy consult previously.       Overweight - Trying to watch diet.  +Exercise - Walking for 30 minutes, 4-5 times per week.       Migraine s aura -  Start Prozac 20mg QD and Compazine 10mg 1/2-1 tab q6 hours PRN x 6 weeks - full pill made her feel tired.  Improved.  Last migraine 2 weeks.  Symptoms x years, worsening in the past 3 years.  B/L frontal, temporal, and crown.  Occurs 3 times per weekly.  Sometimes migraine last 1-3 days.  She has 7 migraine days per month.  +Nausea, vomiting, photophobia, phonophobia.  Using Excedrin migraine 3 days per week PRN.  Previously on daily unknown Rx from [facility] years ago.  No Neuro consult or brain imaging previously.       Nevi -  Pending Derm consult c Dr. Pankaj Hameed 12/24.  Symptoms x 1 year, changing in size and color.  On legs.  +Itching.  Denies  bleeding, scabbing.       Adjustment Disorder c Depression -  She stopped taking Prozac 20mg QD 8 days ago after taking for 5 weeks because she feels better.  Mood is improved.  Symptoms x 2 months.  Feels mood is stable.  Good social supports.  No SI/HI/AH/VH.  No mood meds or counseling previously.       PHQ-2/9 Depression Screening     Little interest or pleasure in doing things: 0 - not at all  Feeling down, depressed, or hopeless: 0 - not at all  Trouble falling or staying asleep, or sleeping too much: 0 - not at all  Feeling tired or having little energy: 0 - not at all  Poor appetite or overeatin - not at all  Feeling bad about yourself - or that you are a failure or have let yourself or your family down: 0 - not at all  Trouble concentrating on things, such as reading the newspaper or watching television: 0 - not at all  Moving or speaking so slowly that other people could have noticed. Or the opposite - being so fidgety or restless that you have been moving around a lot more than usual: 0 - not at all  Thoughts that you would be better off dead, or of hurting yourself in some way: 0 - not at all  PHQ-9 Score: 0  PHQ-9 Interpretation: No or Minimal depression       FORREST-7 Flowsheet Screening      Flowsheet Row Most Recent Value   Over the last two weeks, how often have you been bothered by the following problems?     Feeling nervous, anxious, or on edge 0   Not being able to stop or control worrying 0   Worrying too much about different things 0   Trouble relaxing  0   Being so restless that it's hard to sit still 0   Becoming easily annoyed or irritable  0   Feeling afraid as if something awful might happen 0   How difficult have these problems made it for you to do your work, take care of things at home, or get along with other people?  Not difficult at all   FORREST Score  0                  From previous note:    Medical  [phone] ([id number]) - disconnected.  2nd  used - Renae Key ([id number])     Controlled Substance Review     [place]MP or [place]  reviewed: No red flags were identified; safe to proceed with prescription.     Overweight - Trying to watch diet.  No exercise.       Migraine s aura - Symptoms x years, worsening in the past 3 years.  B/L frontal, temporal, and crown.  Occurs 3 times per weekly.   Sometimes migraine last 1-3 days.  She has 7 migraine days per month.  +Nausea, vomiting, photophobia, phonophobia.  Using Excedrin migraine 3 days per week PRN.  Previously on daily unknown Rx from [facility] years ago.  No Neuro consult or brain imaging previously.       Nevi - Symptoms x 1 year, changing in size and color.  On legs.  +Itching.  Denies  bleeding, scabbing.       Adjustment Disorder c Depression - Symptoms x 2 months.  Feels mood is stable.  Feels tired.  Good social supports.  No SI/HI/AH/VH.  No mood meds or counseling previously.       PHQ-2/9 Depression Screening       Little interest or pleasure in doing things: 1 - several days  Feeling down, depressed, or hopeless: 1 - several days  Trouble falling or staying asleep, or sleeping too much: 2 - more than half the days  Feeling tired or having little energy: 2 - more than half the days  Poor appetite or overeatin - not at all  Feeling bad about yourself - or that you are a failure or have let yourself or your family down: 0 - not at all  Trouble concentrating on things, such as reading the newspaper or watching television: 0 - not at all  Moving or speaking so slowly that other people could have noticed. Or the opposite - being so fidgety or restless that you have been moving around a lot more than usual: 0 - not at all  Thoughts that you would be better off dead, or of hurting yourself in some way: 0 - not at all  PHQ-2 Score: 2  PHQ-2 Interpretation: Negative depression screen  PHQ-9 Score: 6  PHQ-9 Interpretation: Mild depression                     Reviewed:  Labs 10/26/24     No Gyn.  Pending appt c Blanca Thornton PA-C at [facility] .  She is taking Grisel OCP from [facility], but it causes nausea and vomiting.     Sees Dentist q6 months.  Sees Optho q2 years.         The following portions of the patient's history were reviewed and updated as appropriate: allergies, current medications, past family history, past medical  "history, past social history, past surgical history and problem list.      Review of Systems   Constitutional:  Negative for appetite change, chills, diaphoresis, fatigue and fever.   Respiratory:  Negative for chest tightness and shortness of breath.    Cardiovascular:  Negative for chest pain.   Gastrointestinal:  Negative for abdominal pain, blood in stool, diarrhea, nausea and vomiting.   Genitourinary:  Negative for dysuria.   Neurological:  Negative for headaches.        Current Outpatient Medications   Medication Sig Dispense Refill    drospirenone-ethinyl estradiol (JUANA) 3-0.03 MG per tablet Take 1 tablet by mouth daily Rx per [facility]       No current facility-administered medications for this visit.       Objective:  /62   Pulse 72   Temp 97.8 °F (36.6 °C)   Resp 14   Ht 5' 0.25\" (1.53 m)   Wt 61.1 kg (134 lb 9.6 oz)   SpO2 100%   BMI 26.07 kg/m²    Wt Readings from Last 3 Encounters:   11/12/24 61.1 kg (134 lb 9.6 oz)   09/26/24 61.1 kg (134 lb 9.6 oz)      BP Readings from Last 3 Encounters:   11/12/24 100/62   09/26/24 104/62   04/02/24 112/60          Physical Exam  Vitals and nursing note reviewed.   Constitutional:       General: She is not in acute distress.     Appearance: Normal appearance. She is well-developed. She is not ill-appearing or toxic-appearing.   HENT:      Head: Normocephalic and atraumatic.      Mouth/Throat:      Comments: Normal lips  Eyes:      Conjunctiva/sclera: Conjunctivae normal.   Neck:      Thyroid: No thyromegaly.   Cardiovascular:      Rate and Rhythm: Normal rate and regular rhythm.      Pulses: Normal pulses.      Heart sounds: Normal heart sounds.   Pulmonary:      Effort: Pulmonary effort is normal.      Breath sounds: Normal breath sounds.   Musculoskeletal:         General: No swelling or tenderness.      Cervical back: Neck supple.      Right lower leg: No edema.      Left lower leg: No edema.   Lymphadenopathy:      Cervical: No cervical " "adenopathy.   Skin:     Findings: No rash.   Neurological:      General: No focal deficit present.      Mental Status: She is alert and oriented to person, place, and time. Mental status is at baseline.      Cranial Nerves: No cranial nerve deficit.      Sensory: No sensory deficit.      Motor: No weakness.      Coordination: Coordination normal.      Gait: Gait normal.      Deep Tendon Reflexes: Reflexes normal.   Psychiatric:         Mood and Affect: Mood normal.         Behavior: Behavior normal.         Thought Content: Thought content normal.         Judgment: Judgment normal.         Lab Results:      Lab Results   Component Value Date    WBC 5.0 10/26/2024    HGB 12.7 10/26/2024    HCT 40.5 10/26/2024     10/26/2024    TRIG 61 10/26/2024    HDL 50 10/26/2024    LDLDIRECT 73 10/26/2024    ALT 11 10/26/2024    AST 11 10/26/2024    K 4.0 10/26/2024     10/26/2024    CREATININE 0.66 10/26/2024    BUN 11 10/26/2024    CO2 23 10/26/2024    TSH 0.884 10/26/2024     No results found for: \"URICACID\"  Invalid input(s): \"BASENAME\" Vitamin D    No results found.     POCT Labs                   5 minutes spent on chart prep, 35 minutes spent with patient counseling/educating on their diagnoses, tests completed and any new tests ordered, any referrals placed, treatment options, and documentation of above today.   In prescribing new medications, or changing doses, we reviewed the risks and benefits and side effects of these medications along with other treatment options if appropriate.       "

## 2024-11-12 NOTE — PATIENT INSTRUCTIONS
Trial of Zyrtec 10mg twice daily as needed for hives, lip swelling.      Low vitamin D - Recommend start multivitamin and over-the-counter vitamin D3 1000 - 3000 International Units daily.     Patient Education     Urticaria   Conceptos Básicos   Redactado por los médicos y editores de UpToDate   ¿Qué es la urticaria? -- La urticaria son zonas de la piel que generalmente causan ragini gran comezón (imagen 1 y . Se rob hinchadas o con relieve en comparación con el misbah de la piel. En las pieles claras, la urticaria podría ser de color rojizo. Los cambios de color pueden ser difíciles de arden en las pieles más oscuras.  La urticaria puede ocurrir debido a ragini alergia o cuando el sistema inmunitario se “activa” por otra razón. En la mayoría de los casos, la urticaria aparece y desaparece en un par de horas. Sin embargo, algunas personas pueden tenerla de forma recurrente.  Algunas personas que tienen urticaria también tienen un padecimiento llamado “angioedema”. El angioedema es ragini hinchazón o inflamación (figura 1). Se produce generalmente en la gonzalez, los párpados, las orejas, la boca, las ana, los pies o los genitales (imagen 4).  ¿Por qué tengo urticaria? -- Si es la primera vez que tiene urticaria, es posible que tenga ragini nueva alergia a algo. Se puede tener urticaria por alergias a:   Medicinas, viviana antibióticos o aspirina   Alimentos, viviana huevos, nueces, pescado o mariscos   Algo que se toca, viviana ragini planta, saliva de animales o látex   Picaduras de insectos  Si cassidy urticaria se debe a ragini alergia, debe evitar todo aquello a lo que es alérgico.  La urticaria también puede ser causada o “desencadenada” por infecciones, por ejemplo, a causa de un virus o ragini bacteria. En almaz jamie, la urticaria se produce porque el sistema inmunitario se activó para combatir la infección. La urticaria puede aparecer días o semanas después de ragini infección. En algunos casos, es posible que usted recuerde haberse enfermado  "recientemente. Sin embargo, la urticaria también puede ocurrir por infecciones leves que quizás no haya notado. La urticaria de almaz tipo por lo general solo dura unos días.  También hay desencadenantes “físicos” de la urticaria, viviana:   El contacto de la piel con el sol, o con agua o aire frío   Ejercicio   Algo que presiona o vibra sobre la piel   Cambios en la temperatura corporal (viviana cuando el cuerpo se enfría después de ragini ducha caliente o de hacer ejercicio)  A menudo no es posible determinar qué desencadenó la urticaria.  ¿Qué es la urticaria crónica? -- \"Crónica\" significa a linda plazo. Si tuvo urticaria la mayoría de los días huy más de 6 semanas, es posible que tenga urticaria crónica. Esta no se debe a ragini alergia. En la mayoría de los casos, los médicos no saben qué causa la urticaria crónica.  Si tiene urticaria crónica, probablemente debe rajat medicinas todos los días para controlarla. La urticaria crónica afortunadamente suele desaparecer con el tiempo.  ¿Cómo se trata la urticaria? -- Quizás no sea necesario un tratamiento. La urticaria suele desaparecer en unos pocos días o semanas, incluso sin tratamiento. Si tiene urticaria por primera vez, hable con el médico o enfermero acerca de si necesita tratamiento. Si es necesario un tratamiento, el primer paso es averiguar la causa de la urticaria. Si es posible determinar la causa, debe evitarla.  Para aliviar la comezón, puede rajat unas medicinas llamadas antihistamínicos, que son las mismas que generalmente se usan para las alergias.  Si tiene urticaria grave o si la urticaria no desaparece, es posible que cassidy médico o enfermero le sugiera rajat unas medicinas llamadas esteroides huy un período breve. Los esteroides son efectivos para aliviar la comezón y reducir la inflamación. Sin embargo, no debe usarlos huy períodos prolongados porque pueden causar efectos secundarios graves.  ¿Cuándo juan obtener ayuda médica? -- Pida ayuda de " emergencia de inmediato (en . [place] llame al 9-1-1) si repentinamente tiene urticaria o inflamación y también tiene alguno de estos síntomas:   Dificultad para respirar, voz ronca o sibilancia (un nicky que parece un silbido al respirar)   Inflamación, especialmente en la boca o la garganta   Presión en la garganta   Sensación de mareo o de que está por desmayarse  Todos los artículos se actualizan a medida que se descubre nueva evidencia y culmina nuestro proceso de evaluación por homólogos   Bere artículo se recuperó de UpToDate el: Feb 28, 2024.  Artículo 22440 Versión 12.0.es-419.1  Release: 32.2.4 - C32.58  © 2024 [employer] Todos los derechos reservados.  imagen 1: Urticaria     La urticaria son zonas de piel inflamada que generalmente causan ragini gran picazón. Generalmente aparecen y desaparecen en pocas horas, vaughn algunas personas pueden presentar urticaria repetidamente.  Gráfico 24880 Versión 8.0  figura 1: Urticaria y angioedema en la gonzalez     Esta persona tiene urticaria y angioedema (inflamación) facial.  Gráfico [id number] Versión 1.0  imagen 4: Angioedema     El angioedema provoca hinchazón e inflamación de los tejidos que están debajo de la piel. En esta imagen se observa un angioedema de los labios.  Gráfico [id number] Versión 2.0  Exención de responsabilidad y uso de la información del consumidor   Descargo de responsabilidad: esta información generalizada es un resumen limitado de información sobre el diagnóstico, el tratamiento y/o los medicamentos. No pretende ser exhaustiva y se debe utilizar viviana herramienta para ayudar al usuario a comprender y/o evaluar las posibles opciones de diagnóstico y tratamiento. No incluye toda la información sobre afecciones, tratamientos, medicamentos, efectos secundarios o riesgos puedan ser aplicables a un paciente específico. No tiene el propósito de servir viviana recomendación médica ni de sustituir la recomendación médica, el diagnóstico o el tratamiento  de un profesional de atención médica que se base en el examen y la evaluación de almaz profesional de la theresa respecto a las circunstancias específicas y únicas del paciente. Los pacientes deben hablar con un profesional de atención médica para obtener información completa sobre cassidy theresa, cuestiones médicas y opciones de tratamiento, incluidos los riesgos o los beneficios relacionados con el uso de medicamentos. Esta información no certifica que los tratamientos o medicamentos brock seguros, eficaces o estén aprobados para tratar a un paciente específico. [employer] y louise afiliados renuncian a cualquier garantía o responsabilidad relacionada con esta información o el uso de la misma.El uso de esta información está sujeto a las Condiciones de uso, disponibles en https://www.[employer].com/en/know/clinical-effectiveness-terms. 2024© [employer] y louise afiliados y/o licenciantes. Todos los derechos reservados.  Copyright   © 2024 [employer] Todos los derechos reservados.

## 2024-11-12 NOTE — ASSESSMENT & PLAN NOTE
Orders:    Magnesium; Future    Controlled.  Patient self D/C Prozac 20mg QD due to improvement, and Compazine 10mg 1/2-1 tab q6 hours PRN due to dizziness.       For headache and migraine prevention I would suggest a combination vitamin supplement which may include 1 or more of the following ingredients: Magnesium citrate 300 mg twice a day, Riboflavin (vitamin B2) 400 - 600 mg,  Butterbur 150 mg (PA free). Other ingredients that may be helpful are feverfew, coenzyme Q10 100 mg three times a day,  melatonin and kelly.  Some example brands that combine some of these ingredients are Migravent or Dolovent.

## 2024-12-11 ENCOUNTER — OFFICE VISIT (OUTPATIENT)
Age: 39
End: 2024-12-11
Payer: COMMERCIAL

## 2024-12-11 VITALS — TEMPERATURE: 98 F | WEIGHT: 132 LBS | BODY MASS INDEX: 25.57 KG/M2

## 2024-12-11 DIAGNOSIS — D23.9 DERMATOFIBROMA: ICD-10-CM

## 2024-12-11 DIAGNOSIS — L81.4 LENTIGINES: ICD-10-CM

## 2024-12-11 DIAGNOSIS — D22.9 MULTIPLE NEVI: ICD-10-CM

## 2024-12-11 DIAGNOSIS — R23.8 PAPULES: ICD-10-CM

## 2024-12-11 DIAGNOSIS — D22.60 MULTIPLE BENIGN MELANOCYTIC NEVI OF UPPER EXTREMITY, LOWER EXTREMITY, AND TRUNK: Primary | ICD-10-CM

## 2024-12-11 DIAGNOSIS — D18.01 CHERRY ANGIOMA: ICD-10-CM

## 2024-12-11 DIAGNOSIS — L82.1 SEBORRHEIC KERATOSES: ICD-10-CM

## 2024-12-11 DIAGNOSIS — D22.70 MULTIPLE BENIGN MELANOCYTIC NEVI OF UPPER EXTREMITY, LOWER EXTREMITY, AND TRUNK: Primary | ICD-10-CM

## 2024-12-11 DIAGNOSIS — D22.5 MULTIPLE BENIGN MELANOCYTIC NEVI OF UPPER EXTREMITY, LOWER EXTREMITY, AND TRUNK: Primary | ICD-10-CM

## 2024-12-11 DIAGNOSIS — Z12.83 SKIN CANCER SCREENING: ICD-10-CM

## 2024-12-11 PROCEDURE — 99203 OFFICE O/P NEW LOW 30 MIN: CPT | Performed by: REGISTERED NURSE

## 2024-12-11 NOTE — PROGRESS NOTES
"[facility]at[facility] Note     Patient Name: Celina Dixon  Encounter Date: 12/11/24     Have you been cared for by a [facility] Dermatologist in the last 3 years and, if so, which description applies to you?    NO.   I am considered a \"new\" patient and must complete all patient intake questions. I am FEMALE/of child-bearing potential.    REVIEW OF SYSTEMS:  Have you recently had or currently have any of the following? Recent fever or chills? No  Any non-healing wound? No  Are you pregnant or planning to become pregnant? No  Are you currently or planning to be nursing or breast feeding? No   PAST MEDICAL HISTORY:  Have you personally ever had or currently have any of the following?  If \"YES,\" then please provide more detail. Skin cancer (such as Melanoma, Basal Cell Carcinoma, Squamous Cell Carcinoma?  No  Tuberculosis, HIV/AIDS, Hepatitis B or C: No  Radiation Treatment No   HISTORY OF IMMUNOSUPPRESSION:   Do you have a history of any of the following:  Systemic Immunosuppression such as Diabetes, Biologic or Immunotherapy, Chemotherapy, Organ Transplantation, Bone Marrow Transplantation or Prednsione?  No    Answering \"YES\" requires the addition of the dotphrase \"IMMUNOSUPPRESSED\" as the first diagnosis of the patient's visit.   FAMILY HISTORY:  Any \"first degree relatives\" (parent, brother, sister, or child) with the following?    Skin Cancer, Pancreatic or Other Cancer? No   PATIENT EXPERIENCE:    Do you want the Dermatologist to perform a COMPLETE skin exam today including a clinical examination under the \"bra and underwear\" areas?  Yes  If necessary, do we have your permission to call and leave a detailed message on your Preferred Phone number that includes your specific medical information?  Yes      No Known Allergies   Current Outpatient Medications:     drospirenone-ethinyl estradiol (JUANA) 3-0.03 MG per tablet, Take 1 tablet by mouth daily Rx per [place], Disp: , Rfl:         Whom besides " "the patient is providing clinical information about today's encounter?   Other:  : Layla Chatman    Physical Exam and Assessment/Plan by Diagnosis:    CHERRY ANGIOMAS  Physical Exam:  Anatomic Location Affected:  Trunk and extremities  Morphological Description:  Scattered cherry red papules  Denies pain, itch, bleeding. No treatments tried. Present for years. Present constantly; no modifying factors which make it worse or better.     Assessment and Plan:  Based on a thorough discussion of this condition and the management approach to it (including a comprehensive discussion of the known risks, side effects and potential benefits of treatment), the patient (family) agrees to implement the following specific plan:  Reassure benign        SEBORRHEIC KERATOSIS; NON-INFLAMED  Physical Exam:  Anatomic Location Affected:  Trunk and extremities  Morphological Description:  Waxy, smooth to warty textured, yellow to brownish-grey to dark brown to blackish, discrete, \"stuck-on\" appearing papules.  Present for years. Denies pain, itch, bleeding.      Additional History of Present Condition:  Present constantly; no modifying factors which make it worse or better. No prior treatment.       Assessment and Plan:  Based on a thorough discussion of this condition and the management approach to it (including a comprehensive discussion of the known risks, side effects and potential benefits of treatment), the patient (family) agrees to implement the following specific plan:  Reassure benign  Use sun protection.  Apply SPF 30 or higher at least three times a day.  Wear sun protecting clothing and hats.        SOLAR LENTIGINES   OTHER SKIN CHANGES DUE TO CHRONIC EXPOSURE TO NONIONIZING RADIATION  Physical Exam:  Anatomic Location Affected:  Sun exposed areas of back, chest, arms, legs  Morphological Description:  Multiple scattered brown to tan evenly pigmented macules   Denies pain, itch, bleeding. No treatments tried. " "Present for months - years. Reports getting newer lesions with sun exposure.      Assessment and Plan:  Based on a thorough discussion of this condition and the management approach to it (including a comprehensive discussion of the known risks, side effects and potential benefits of treatment), the patient (family) agrees to implement the following specific plan:  Reassure benign  Use sun protection.  Apply SPF 30 or higher at least three times a day.  Wear sun protecting clothing and hats.         MULTIPLE MELANOCYTIC NEVI (\"Moles\")  Physical Exam:  Anatomic Location Affected: Trunk and extremities  Morphological Description:  Scattered, round to ovoid, symmetrical-appearing, even bordered, skin colored to dark brown macules/papules  Denies pain, itch, bleeding. No treatments tried. Present for years. Present constantly; no modifying factors which make it worse or better. Denies actively changing or growing moles.      Assessment and Plan:  Based on a thorough discussion of this condition and the management approach to it (including a comprehensive discussion of the known risks, side effects and potential benefits of treatment), the patient (family) agrees to implement the following specific plan:  Reassure benign  Monitor for changes  Use sun protection.  Apply SPF 30 or higher at least three times a day.  Wear sun protecting clothing and hats.    Worrisome signs of skin malignancy discussed, questions answered. Regular self-skin check discussed. Advised to call or return to office if patient notices any spots of concern, rapidly growing/changing lesions, bleeding lesions, non-healing lesions. Advised regular SPF use.       DERMATOFIBROMA  Physical Exam:  Anatomic Location Affected:  left and right shin  Morphological Description:  fibrotic papule    Pertinent Positives:  Pertinent Negatives:    Additional History of Present Condition:  Patient reports that these spots itch sometimes.    Assessment and " Plan:  Based on a thorough discussion of this condition and the management approach to it (including a comprehensive discussion of the known risks, side effects and potential benefits of treatment), the patient (family) agrees to implement the following specific plan:  Reassured benign.  Offered cryotherapy but patient deferred  Recommend moisturizing the legs frequently with a moisturizer such as CeraVe or Cetaphil.      Scribe Attestation      I,:  Robyn Tamez MA am acting as a scribe while in the presence of the attending physician.:       I,:  Cliff Lemos MD personally performed the services described in this documentation    as scribed in my presence.:

## 2025-01-15 ENCOUNTER — TELEPHONE (OUTPATIENT)
Dept: FAMILY MEDICINE CLINIC | Facility: CLINIC | Age: 40
End: 2025-01-15

## 2025-01-15 ENCOUNTER — TELEPHONE (OUTPATIENT)
Age: 40
End: 2025-01-15

## 2025-01-15 DIAGNOSIS — Z12.4 SCREENING FOR CERVICAL CANCER: Primary | ICD-10-CM

## 2025-01-15 NOTE — TELEPHONE ENCOUNTER
Previous encounter was read back to patient. Appointment was cancelled and message was sent to ob gyn office for follow appointment.

## 2025-01-15 NOTE — TELEPHONE ENCOUNTER
services used to contact patient ([facility] [id number]).  lvm for patient to contact office in regards to tomorrows appointment.

## 2025-01-15 NOTE — TELEPHONE ENCOUNTER
Patient self-scheduled 8:40am Thus 1/16/25 for possible pregnancy.  She does not need to be seen for an appt for this.  If it's been over 1 month since her last period, she can take an OTC pregnancy test ([employer] brand is fine).  If positive, she should schedule an OB appointment and start OTC Prenatal vitamin with DHA.  She has appt c OB 3/7/25.        As patient is [place] speaking, if she desires to keep her appt, she needs to be rescheduled for 40 minutes.

## 2025-01-15 NOTE — TELEPHONE ENCOUNTER
Patient is calling to schedule a sooner appointment because she is having mild pains in her stomach. Patient was giving an option to speak with on call staff and refused per patient if pain gets worst she will just go to the ER. Please follow up and advise .Thank you in advance.

## 2025-01-16 NOTE — TELEPHONE ENCOUNTER
Upon chart review it looks like she called PCP as there was a possibility she may be pregnant.  She was advised to take a home pregnancy test.

## 2025-01-21 ENCOUNTER — APPOINTMENT (EMERGENCY)
Dept: ULTRASOUND IMAGING | Facility: HOSPITAL | Age: 40
End: 2025-01-21
Payer: COMMERCIAL

## 2025-01-21 ENCOUNTER — HOSPITAL ENCOUNTER (EMERGENCY)
Facility: HOSPITAL | Age: 40
Discharge: HOME | End: 2025-01-22
Attending: EMERGENCY MEDICINE | Admitting: EMERGENCY MEDICINE
Payer: COMMERCIAL

## 2025-01-21 VITALS
SYSTOLIC BLOOD PRESSURE: 119 MMHG | OXYGEN SATURATION: 99 % | HEART RATE: 97 BPM | DIASTOLIC BLOOD PRESSURE: 63 MMHG | TEMPERATURE: 98.5 F | RESPIRATION RATE: 18 BRPM

## 2025-01-21 DIAGNOSIS — Z32.02 URINE PREGNANCY TEST NEGATIVE: Primary | ICD-10-CM

## 2025-01-21 DIAGNOSIS — N83.201 RIGHT OVARIAN CYST: ICD-10-CM

## 2025-01-21 LAB
ALBUMIN SERPL BCG-MCNC: 4.2 G/DL (ref 3.5–5)
ALP SERPL-CCNC: 59 U/L (ref 34–104)
ALT SERPL W P-5'-P-CCNC: 11 U/L (ref 7–52)
ANION GAP SERPL CALCULATED.3IONS-SCNC: 7 MMOL/L (ref 4–13)
APTT PPP: 31 SECONDS (ref 23–34)
AST SERPL W P-5'-P-CCNC: 12 U/L (ref 13–39)
B-HCG SERPL-ACNC: 3573.6 MIU/ML (ref 0–5)
BASOPHILS # BLD AUTO: 0.05 THOUSANDS/ΜL (ref 0–0.1)
BASOPHILS NFR BLD AUTO: 1 % (ref 0–1)
BILIRUB SERPL-MCNC: 0.2 MG/DL (ref 0.2–1)
BILIRUB UR QL STRIP: NEGATIVE
BUN SERPL-MCNC: 11 MG/DL (ref 5–25)
CALCIUM SERPL-MCNC: 9 MG/DL (ref 8.4–10.2)
CHLORIDE SERPL-SCNC: 105 MMOL/L (ref 96–108)
CLARITY UR: CLEAR
CO2 SERPL-SCNC: 26 MMOL/L (ref 21–32)
COLOR UR: COLORLESS
CREAT SERPL-MCNC: 0.5 MG/DL (ref 0.6–1.3)
EOSINOPHIL # BLD AUTO: 0.07 THOUSAND/ΜL (ref 0–0.61)
EOSINOPHIL NFR BLD AUTO: 1 % (ref 0–6)
ERYTHROCYTE [DISTWIDTH] IN BLOOD BY AUTOMATED COUNT: 13.2 % (ref 11.6–15.1)
EXT PREGNANCY TEST URINE: POSITIVE
EXT. CONTROL: ABNORMAL
GFR SERPL CREATININE-BSD FRML MDRD: 123 ML/MIN/1.73SQ M
GLUCOSE SERPL-MCNC: 80 MG/DL (ref 65–140)
GLUCOSE UR STRIP-MCNC: NEGATIVE MG/DL
HCT VFR BLD AUTO: 37.7 % (ref 34.8–46.1)
HGB BLD-MCNC: 12.2 G/DL (ref 11.5–15.4)
HGB UR QL STRIP.AUTO: NEGATIVE
IMM GRANULOCYTES # BLD AUTO: 0.02 THOUSAND/UL (ref 0–0.2)
IMM GRANULOCYTES NFR BLD AUTO: 0 % (ref 0–2)
INR PPP: 0.92 (ref 0.85–1.19)
KETONES UR STRIP-MCNC: NEGATIVE MG/DL
LEUKOCYTE ESTERASE UR QL STRIP: NEGATIVE
LIPASE SERPL-CCNC: 27 U/L (ref 11–82)
LYMPHOCYTES # BLD AUTO: 2.4 THOUSANDS/ΜL (ref 0.6–4.47)
LYMPHOCYTES NFR BLD AUTO: 27 % (ref 14–44)
MCH RBC QN AUTO: 28 PG (ref 26.8–34.3)
MCHC RBC AUTO-ENTMCNC: 32.4 G/DL (ref 31.4–37.4)
MCV RBC AUTO: 87 FL (ref 82–98)
MONOCYTES # BLD AUTO: 0.7 THOUSAND/ΜL (ref 0.17–1.22)
MONOCYTES NFR BLD AUTO: 8 % (ref 4–12)
NEUTROPHILS # BLD AUTO: 5.57 THOUSANDS/ΜL (ref 1.85–7.62)
NEUTS SEG NFR BLD AUTO: 63 % (ref 43–75)
NITRITE UR QL STRIP: NEGATIVE
NRBC BLD AUTO-RTO: 0 /100 WBCS
PH UR STRIP.AUTO: 5 [PH]
PLATELET # BLD AUTO: 238 THOUSANDS/UL (ref 149–390)
PMV BLD AUTO: 12 FL (ref 8.9–12.7)
POTASSIUM SERPL-SCNC: 3.5 MMOL/L (ref 3.5–5.3)
PROT SERPL-MCNC: 7.2 G/DL (ref 6.4–8.4)
PROT UR STRIP-MCNC: NEGATIVE MG/DL
PROTHROMBIN TIME: 13.1 SECONDS (ref 12.3–15)
RBC # BLD AUTO: 4.36 MILLION/UL (ref 3.81–5.12)
SODIUM SERPL-SCNC: 138 MMOL/L (ref 135–147)
SP GR UR STRIP.AUTO: 1.01 (ref 1–1.03)
UROBILINOGEN UR STRIP-ACNC: <2 MG/DL
WBC # BLD AUTO: 8.81 THOUSAND/UL (ref 4.31–10.16)

## 2025-01-21 PROCEDURE — 84702 CHORIONIC GONADOTROPIN TEST: CPT | Performed by: PHYSICIAN ASSISTANT

## 2025-01-21 PROCEDURE — 96365 THER/PROPH/DIAG IV INF INIT: CPT

## 2025-01-21 PROCEDURE — 80053 COMPREHEN METABOLIC PANEL: CPT | Performed by: PHYSICIAN ASSISTANT

## 2025-01-21 PROCEDURE — 81025 URINE PREGNANCY TEST: CPT | Performed by: PHYSICIAN ASSISTANT

## 2025-01-21 PROCEDURE — 99284 EMERGENCY DEPT VISIT MOD MDM: CPT

## 2025-01-21 PROCEDURE — 96361 HYDRATE IV INFUSION ADD-ON: CPT

## 2025-01-21 PROCEDURE — 85025 COMPLETE CBC W/AUTO DIFF WBC: CPT | Performed by: PHYSICIAN ASSISTANT

## 2025-01-21 PROCEDURE — 85610 PROTHROMBIN TIME: CPT | Performed by: PHYSICIAN ASSISTANT

## 2025-01-21 PROCEDURE — 83690 ASSAY OF LIPASE: CPT | Performed by: PHYSICIAN ASSISTANT

## 2025-01-21 PROCEDURE — 36415 COLL VENOUS BLD VENIPUNCTURE: CPT | Performed by: PHYSICIAN ASSISTANT

## 2025-01-21 PROCEDURE — 76815 OB US LIMITED FETUS(S): CPT

## 2025-01-21 PROCEDURE — 87086 URINE CULTURE/COLONY COUNT: CPT | Performed by: PHYSICIAN ASSISTANT

## 2025-01-21 PROCEDURE — 99284 EMERGENCY DEPT VISIT MOD MDM: CPT | Performed by: PHYSICIAN ASSISTANT

## 2025-01-21 PROCEDURE — 81003 URINALYSIS AUTO W/O SCOPE: CPT | Performed by: PHYSICIAN ASSISTANT

## 2025-01-21 PROCEDURE — 85730 THROMBOPLASTIN TIME PARTIAL: CPT | Performed by: PHYSICIAN ASSISTANT

## 2025-01-21 RX ORDER — ACETAMINOPHEN 10 MG/ML
1000 INJECTION, SOLUTION INTRAVENOUS ONCE
Status: COMPLETED | OUTPATIENT
Start: 2025-01-21 | End: 2025-01-21

## 2025-01-21 RX ADMIN — SODIUM CHLORIDE 1000 ML: 0.9 INJECTION, SOLUTION INTRAVENOUS at 21:38

## 2025-01-21 RX ADMIN — ACETAMINOPHEN 1000 MG: 10 INJECTION INTRAVENOUS at 21:38

## 2025-01-21 NOTE — Clinical Note
accompanied Celina Dixon to the emergency department on 1/21/2025.    Return date if applicable: 01/23/2025        If you have any questions or concerns, please don't hesitate to call.      Carlos Perales PA-C

## 2025-01-21 NOTE — Clinical Note
Celina Dixon was seen and treated in our emergency department on 1/21/2025.                Diagnosis:     Celina  may return to work on return date.    She may return on this date: 01/23/2025         If you have any questions or concerns, please don't hesitate to call.      Carlos Perales PA-C    ______________________________           _______________          _______________  Hospital Representative                              Date                                Time

## 2025-01-22 NOTE — DISCHARGE INSTRUCTIONS
Reading Physician Reading Date Result Priority   Omari Cheema MD  [phone]     1/21/2025      Narrative & Impression  OBSTETRIC ULTRASOUND, LIMITED     INDICATION: Pregnancy     COMPARISON: None.     TECHNIQUE:   Transabdominal ultrasound of the pelvis was performed.  Additional transvaginal imaging was then performed to better assess, myometrial/endometrial architecture and ovarian parenchymal detail. The study includes volumetric sweeps and   traditional still imaging technique.     FINDINGS:     UTERUS:  The uterus is anteverted in position, measuring 8.0 x 4.5 x 5.7 cm.  Contour and echotexture appear normal.  Nabothian cysts within the cervix.     ENDOMETRIUM:  Probable intrauterine gestational sac measures 0.53 x 0.63 x 0.74 cm. No yolk sac or embryo is seen.     OVARIES/ADNEXA:  No adnexal mass evident.  There is free fluid in the pelvis.     Right ovary:  3.4 x 4.2 x 3.1 cm. Volume 22.8  Doppler flow present.  Right ovarian cyst measures 3.1 x 2.7 x 3.7 cm.     Left ovary: Absent     IMPRESSION:     1.  Probable intrauterine gestational sac measuring 5.3 x 6.3 x 7.4 mm most compatible with early intrauterine pregnancy. No yolk sac or embryo seen at this time. Follow-up with beta hCG and ultrasound as clinically warranted.  2.  3.7 cm simple right ovarian cyst.                 Workstation performed: [id number]

## 2025-01-22 NOTE — ED PROVIDER NOTES
"Time reflects when diagnosis was documented in both MDM as applicable and the Disposition within this note       Time User Action Codes Description Comment    2025 12:18 AM Carlos Perales [N83.201] Right ovarian cyst     2025 12:18 AM Carlos Perales [Z32.02] Urine pregnancy test negative     2025 12:18 AM Carlos Perales Modify [N83.201] Right ovarian cyst     2025 12:18 AM Carlos Perales Modify [Z32.02] Urine pregnancy test negative           ED Disposition       ED Disposition   Discharge    Condition   Stable    Date/Time    12:19 AM    Comment   Celina Dixon discharge to home/self care.                   Assessment & Plan       Medical Decision Making  Patient is a  approximately a 7-day gestation, history of  section, left salpingectomy secondary to ectopic pregnancy surgery that presents to the emergency department with aching persistent worsening nonradiating right lower quadrant abdominal pain symptoms for 8 days.    Patient hemodynamically stable and afebrile  No sirs  Urine pregnancy positive; hCG quantitative 3573.6  No electrolytes, normal kidney function  No leukocytosis, no bandemia  Urinalysis not indicative of urinary tract infection  Ultrasound OB pregnancy limited with transvaginal-impression-\"1.  Probable intrauterine gestational sac measuring 5.3 x 6.3 x 7.4 mm most compatible with early intrauterine pregnancy. No yolk sac or embryo seen at this time. Follow-up with beta hCG and ultrasound as clinically warranted.   2.  3.7 cm simple right ovarian cyst.\"  Delivered tylenol in the emergency department; patient demonstrates decrease in presenting right lower quadrant pain ED symptomatology status post medication delivery  Ddx likely and not limited to ovarian cyst, ovarian torsion, acute appendicitis, pyelonephritis, cystitis, ureteral stone  Will treat for right ovarian cyst  Follow-up with OB/GYN  Follow-up with PCP  Follow up with " emergency department if symptoms persist or exacerbate  Patient demonstrates verbal understanding of all clinical laboratory and imaging findings, discharge instructions, follow-up, and verbally agrees with current treatment plan with teach back    *Due to voice recognition software, sound alike and misspelled words may be contained in the documentation*    Amount and/or Complexity of Data Reviewed  Labs: ordered. Decision-making details documented in ED Course.  Radiology: ordered and independent interpretation performed. Decision-making details documented in ED Course.    Risk  Prescription drug management.             Medications   sodium chloride 0.9 % bolus 1,000 mL (0 mL Intravenous Stopped 25 0025)   acetaminophen (Ofirmev) injection 1,000 mg (0 mg Intravenous Stopped 25 2200)       ED Risk Strat Scores                                              History of Present Illness       Chief Complaint   Patient presents with    Pelvic Pain - Pregnant     Reports recent pregnancy test was positive 8 days ago, started with worsening right lower pelvic pain yesterday.  Hx of left oophorectomy and multiple miscarriage.        Patient is a  approximately a 7-day gestation, history of  section, left salpingectomy secondary to ectopic pregnancy surgery that presents to the emergency department with aching persistent worsening nonradiating right lower quadrant abdominal pain symptoms for 8 days.  Patient denies associated symptoms.  Patient states that she had taken a home pregnancy test this past week, noted that it was positive and came into the ED for further evaluation of right lower quadrant abdominal pain symptoms for concern of possible ectopic pregnancy.  Patient does report that her menstrual period on 2024.  Patient denies palliative factors with provocative factors of pressure to right lower quadrant abdomen.  Patient denies noneffective treatment.  Patient denies fevers,  chills, nausea, vomiting, diarrhea, constipation and urinary symptoms.  Patient denies recent fall and recent trauma.  Patient denies sick contacts recent travel.  Patient denies chest pain and shortness of breath.  All information was gleaned via ; English to [place] translation.      Past Medical History:   Diagnosis Date    Adjustment disorder with depressed mood 2024    Allergic 10/10    Migraines     Overweight       Past Surgical History:   Procedure Laterality Date     SECTION  2018    ECTOPIC PREGNANCY SURGERY  2019    with L fallopian tube removal    SALPINGECTOMY Left 2019    Dueto Ectopic Pregnancy      Family History   Problem Relation Age of Onset    Hypothyroidism Mother     Vision loss Father         Retinal thrombosis due to HTN. Loss of vision in the R eye.    Hypertension Father     Thrombosis Father 70        Retinal Thrombosis    No Known Problems Sister     No Known Problems Brother     No Known Problems Daughter     Diabetes Maternal Grandmother     Diabetes Maternal Grandfather       Social History     Tobacco Use    Smoking status: Never     Passive exposure: Past    Smokeless tobacco: Never   Vaping Use    Vaping status: Never Used   Substance Use Topics    Alcohol use: Not Currently     Comment: social    Drug use: Not Currently      E-Cigarette/Vaping    E-Cigarette Use Never User       E-Cigarette/Vaping Substances    Nicotine No     THC No     CBD No     Flavoring No     Other No     Unknown No       I have reviewed and agree with the history as documented.       History provided by:  Patient   used: No        Review of Systems   Constitutional:  Negative for activity change, appetite change, chills and fever.   HENT:  Negative for congestion, postnasal drip, rhinorrhea, sinus pressure, sinus pain, sore throat and tinnitus.    Eyes:  Negative for photophobia and visual disturbance.   Respiratory:  Negative for cough, chest tightness and  shortness of breath.    Cardiovascular:  Negative for chest pain and palpitations.   Gastrointestinal:  Positive for abdominal pain. Negative for constipation, diarrhea, nausea and vomiting.   Genitourinary:  Negative for difficulty urinating, dysuria, flank pain, frequency and urgency.   Musculoskeletal:  Negative for back pain, gait problem, neck pain and neck stiffness.   Skin:  Negative for pallor and rash.   Allergic/Immunologic: Negative for environmental allergies and food allergies.   Neurological:  Negative for dizziness, weakness, numbness and headaches.   Psychiatric/Behavioral:  Negative for confusion.    All other systems reviewed and are negative.          Objective       ED Triage Vitals [01/21/25 1943]   Temperature Pulse Blood Pressure Respirations SpO2 Patient Position - Orthostatic VS   98.5 °F (36.9 °C) 97 119/63 18 99 % --      Temp Source Heart Rate Source BP Location FiO2 (%) Pain Score    Oral Monitor Right arm -- --      Vitals      Date and Time Temp Pulse SpO2 Resp BP Pain Score FACES Pain Rating User   01/21/25 1943 98.5 °F (36.9 °C) 97 99 % 18 119/63 -- -- AR            Physical Exam  Vitals and nursing note reviewed.   Constitutional:       General: She is awake.      Appearance: Normal appearance. She is well-developed. She is not ill-appearing, toxic-appearing or diaphoretic.      Comments: /63 (BP Location: Right arm)   Pulse 97   Temp 98.5 °F (36.9 °C) (Oral)   Resp 18   LMP 12/07/2024   SpO2 99%      HENT:      Head: Normocephalic and atraumatic.      Right Ear: Hearing and external ear normal. No decreased hearing noted. No drainage, swelling or tenderness. No mastoid tenderness.      Left Ear: Hearing and external ear normal. No decreased hearing noted. No drainage, swelling or tenderness. No mastoid tenderness.      Nose: Nose normal.      Mouth/Throat:      Lips: Pink.      Mouth: Mucous membranes are moist.      Pharynx: Oropharynx is clear. Uvula midline.   Eyes:       General: Lids are normal. Vision grossly intact.         Right eye: No discharge.         Left eye: No discharge.      Extraocular Movements: Extraocular movements intact.      Conjunctiva/sclera: Conjunctivae normal.      Pupils: Pupils are equal, round, and reactive to light.   Neck:      Vascular: No JVD.      Trachea: Trachea and phonation normal. No tracheal tenderness or tracheal deviation.   Cardiovascular:      Rate and Rhythm: Normal rate and regular rhythm.      Pulses: Normal pulses.           Radial pulses are 2+ on the right side and 2+ on the left side.        Posterior tibial pulses are 2+ on the right side and 2+ on the left side.      Heart sounds: Normal heart sounds.   Pulmonary:      Effort: Pulmonary effort is normal.      Breath sounds: Normal breath sounds. No stridor. No decreased breath sounds, wheezing, rhonchi or rales.   Chest:      Chest wall: No tenderness.   Abdominal:      General: Abdomen is flat. Bowel sounds are normal. There is no distension.      Palpations: Abdomen is soft. Abdomen is not rigid.      Tenderness: There is abdominal tenderness in the right lower quadrant and suprapubic area. There is no right CVA tenderness, left CVA tenderness, guarding or rebound.   Musculoskeletal:         General: Normal range of motion.      Cervical back: Full passive range of motion without pain, normal range of motion and neck supple. No rigidity. No spinous process tenderness or muscular tenderness. Normal range of motion.   Lymphadenopathy:      Head:      Right side of head: No submental, submandibular, tonsillar, preauricular, posterior auricular or occipital adenopathy.      Left side of head: No submental, submandibular, tonsillar, preauricular, posterior auricular or occipital adenopathy.      Cervical: No cervical adenopathy.      Right cervical: No superficial, deep or posterior cervical adenopathy.     Left cervical: No superficial, deep or posterior cervical adenopathy.    Skin:     General: Skin is warm.      Capillary Refill: Capillary refill takes less than 2 seconds.   Neurological:      General: No focal deficit present.      Mental Status: She is alert and oriented to person, place, and time.      GCS: GCS eye subscore is 4. GCS verbal subscore is 5. GCS motor subscore is 6.      Sensory: No sensory deficit.      Deep Tendon Reflexes: Reflexes are normal and symmetric.      Reflex Scores:       Patellar reflexes are 2+ on the right side and 2+ on the left side.  Psychiatric:         Mood and Affect: Mood normal.         Speech: Speech normal.         Behavior: Behavior normal. Behavior is cooperative.         Thought Content: Thought content normal.         Judgment: Judgment normal.         Results Reviewed       Procedure Component Value Units Date/Time    hCG, quantitative [[id number]]  (Abnormal) Collected: 01/21/25 2137    Lab Status: Final result Specimen: Blood from Arm, Right Updated: 01/21/25 2234     HCG, Quant 3,573.6 mIU/mL     Narrative:       Expected Ranges:    HCG results between 5.0 and 25.0 mIU/mL may be indicative of early pregnancy but should be interpreted in light of the total clinical presentation.    HCG can rise to detectable levels in chintan and post menopausal women (0-11.6 mIU/mL).     Approximate               Approximate HCG  Gestation age          Concentration ( mIU/mL)  _____________          ______________________   Weeks                      HCG values  0.2-1                       5-50  1-2                           2-3                         100-5000  3-4                         500-69879  4-5                         1000-78813  5-6                         [id number]  6-8                         [id number]  8-12                        [id number]      Comprehensive metabolic panel [[id number]]  (Abnormal) Collected: 01/21/25 2137    Lab Status: Final result Specimen: Blood from Arm, Right Updated: 01/21/25 2207     Sodium 138 mmol/L       Potassium 3.5 mmol/L      Chloride 105 mmol/L      CO2 26 mmol/L      ANION GAP 7 mmol/L      BUN 11 mg/dL      Creatinine 0.50 mg/dL      Glucose 80 mg/dL      Calcium 9.0 mg/dL      AST 12 U/L      ALT 11 U/L      Alkaline Phosphatase 59 U/L      Total Protein 7.2 g/dL      Albumin 4.2 g/dL      Total Bilirubin 0.20 mg/dL      eGFR 123 ml/min/1.73sq m     Narrative:      National Kidney Disease Foundation guidelines for Chronic Kidney Disease (CKD):     Stage 1 with normal or high GFR (GFR > 90 mL/min/1.73 square meters)    Stage 2 Mild CKD (GFR = 60-89 mL/min/1.73 square meters)    Stage 3A Moderate CKD (GFR = 45-59 mL/min/1.73 square meters)    Stage 3B Moderate CKD (GFR = 30-44 mL/min/1.73 square meters)    Stage 4 Severe CKD (GFR = 15-29 mL/min/1.73 square meters)    Stage 5 End Stage CKD (GFR <15 mL/min/1.73 square meters)  Note: GFR calculation is accurate only with a steady state creatinine    Lipase [[id number]]  (Normal) Collected: 01/21/25 2137    Lab Status: Final result Specimen: Blood from Arm, Right Updated: 01/21/25 2207     Lipase 27 u/L     Protime-INR [[id number]]  (Normal) Collected: 01/21/25 2137    Lab Status: Final result Specimen: Blood from Arm, Right Updated: 01/21/25 2203     Protime 13.1 seconds      INR 0.92    Narrative:      INR Therapeutic Range    Indication                                             INR Range      Atrial Fibrillation                                               2.0-3.0  Hypercoagulable State                                    2.0.2.3  Left Ventricular Asist Device                            2.0-3.0  Mechanical Heart Valve                                  -    Aortic(with afib, MI, embolism, HF, LA enlargement,    and/or coagulopathy)                                     2.0-3.0 (2.5-3.5)     Mitral                                                             2.5-3.5  Prosthetic/Bioprosthetic Heart Valve               2.0-3.0  Venous thromboembolism (VTE: VT, PE         2.0-3.0    APTT [[id number]]  (Normal) Collected: 01/21/25 2137    Lab Status: Final result Specimen: Blood from Arm, Right Updated: 01/21/25 2203     PTT 31 seconds     CBC and differential [[id number]] Collected: 01/21/25 2137    Lab Status: Final result Specimen: Blood from Arm, Right Updated: 01/21/25 2151     WBC 8.81 Thousand/uL      RBC 4.36 Million/uL      Hemoglobin 12.2 g/dL      Hematocrit 37.7 %      MCV 87 fL      MCH 28.0 pg      MCHC 32.4 g/dL      RDW 13.2 %      MPV 12.0 fL      Platelets 238 Thousands/uL      nRBC 0 /100 WBCs      Segmented % 63 %      Immature Grans % 0 %      Lymphocytes % 27 %      Monocytes % 8 %      Eosinophils Relative 1 %      Basophils Relative 1 %      Absolute Neutrophils 5.57 Thousands/µL      Absolute Immature Grans 0.02 Thousand/uL      Absolute Lymphocytes 2.40 Thousands/µL      Absolute Monocytes 0.70 Thousand/µL      Eosinophils Absolute 0.07 Thousand/µL      Basophils Absolute 0.05 Thousands/µL     POCT pregnancy, urine [[id number]]  (Abnormal) Collected: 01/21/25 2110    Lab Status: Final result Updated: 01/21/25 2110     EXT Preg Test, Ur Positive     Control Valid    UA w Reflex to Microscopic w Reflex to Culture [[id number]] Collected: 01/21/25 2054    Lab Status: Final result Specimen: Urine, Clean Catch Updated: 01/21/25 2103     Color, UA Colorless     Clarity, UA Clear     Specific Gravity, UA 1.007     pH, UA 5.0     Leukocytes, UA Negative     Nitrite, UA Negative     Protein, UA Negative mg/dl      Glucose, UA Negative mg/dl      Ketones, UA Negative mg/dl      Urobilinogen, UA <2.0 mg/dl      Bilirubin, UA Negative     Occult Blood, UA Negative     URINE COMMENT --    Urine culture [[id number]] Collected: 01/21/25 2054    Lab Status: In process Specimen: Urine, Clean Catch Updated: 01/21/25 2103            US OB pregnancy limited with transvaginal   ED Interpretation by Carlos Perales PA-C (01/21 2308)   Reading Physician Reading Date Result  Priority  Omari Cheema MD  [phone]    1/21/2025     Narrative & Impression  OBSTETRIC ULTRASOUND, LIMITED     INDICATION: Pregnancy     COMPARISON: None.     TECHNIQUE:   Transabdominal ultrasound of the pelvis was performed.  Additional transvaginal imaging was then performed to better assess, myometrial/endometrial architecture and ovarian parenchymal detail. The study includes volumetric sweeps and   traditional still imaging technique.     FINDINGS:     UTERUS:  The uterus is anteverted in position, measuring 8.0 x 4.5 x 5.7 cm.  Contour and echotexture appear normal.  Nabothian cysts within the cervix.     ENDOMETRIUM:  Probable intrauterine gestational sac measures 0.53 x 0.63 x 0.74 cm. No yolk sac or embryo is seen.     OVARIES/ADNEXA:  No adnexal mass evident.  There is free fluid in the pelvis.     Right ovary:  3.4 x 4.2 x 3.1 cm. Volume 22.8  Doppler flow present.  Right ovarian cyst measures 3.1 x 2.7 x 3.7 cm.     Left ovary: A   bsent     IMPRESSION:     1.  Probable intrauterine gestational sac measuring 5.3 x 6.3 x 7.4 mm most compatible with early intrauterine pregnancy. No yolk sac or embryo seen at this time. Follow-up with beta hCG and ultrasound as clinically warranted.  2.  3.7 cm simple right ovarian cyst.                 Workstation performed: [id number]        Final Interpretation by Omari Cheema MD (01/21 2259)      1.  Probable intrauterine gestational sac measuring 5.3 x 6.3 x 7.4 mm most compatible with early intrauterine pregnancy. No yolk sac or embryo seen at this time. Follow-up with beta hCG and ultrasound as clinically warranted.   2.  3.7 cm simple right ovarian cyst.                  Workstation performed: [id number]             Procedures    ED Medication and Procedure Management   Prior to Admission Medications   Prescriptions Last Dose Informant Patient Reported? Taking?   drospirenone-ethinyl estradiol (JUANA) 3-0.03 MG per tablet   Yes No   Sig: Take 1 tablet by  mouth daily Rx per [place]      Facility-Administered Medications: None     Discharge Medication List as of 1/22/2025 12:19 AM        CONTINUE these medications which have NOT CHANGED    Details   drospirenone-ethinyl estradiol (JUANA) 3-0.03 MG per tablet Take 1 tablet by mouth daily Rx per [place], Historical Med           No discharge procedures on file.  ED SEPSIS DOCUMENTATION   Time reflects when diagnosis was documented in both MDM as applicable and the Disposition within this note       Time User Action Codes Description Comment    1/22/2025 12:18 AM Carlos Perales [N83.201] Right ovarian cyst     1/22/2025 12:18 AM Carlos Perales [Z32.02] Urine pregnancy test negative     1/22/2025 12:18 AM Carlos Perales [N83.201] Right ovarian cyst     1/22/2025 12:18 AM Carlos Perales [Z32.02] Urine pregnancy test negative                  Carlos Perales PA-C  01/22/25 0751

## 2025-01-23 LAB — BACTERIA UR CULT: NORMAL

## 2025-01-24 ENCOUNTER — ULTRASOUND (OUTPATIENT)
Dept: OBGYN CLINIC | Facility: CLINIC | Age: 40
End: 2025-01-24
Payer: COMMERCIAL

## 2025-01-24 VITALS — BODY MASS INDEX: 25.95 KG/M2 | SYSTOLIC BLOOD PRESSURE: 108 MMHG | WEIGHT: 134 LBS | DIASTOLIC BLOOD PRESSURE: 68 MMHG

## 2025-01-24 DIAGNOSIS — N91.2 AMENORRHEA: Primary | ICD-10-CM

## 2025-01-24 PROCEDURE — 99213 OFFICE O/P EST LOW 20 MIN: CPT | Performed by: OBSTETRICS & GYNECOLOGY

## 2025-01-24 NOTE — PROGRESS NOTES
[id number] used for translation during this visit.  S: 39 y.o.   who presents for viability scan with LMP of 24. She is 6 weeks and 6 days by her LMP.  Patient was in ED 2025 due to discomfort in the right lower quadrant.  Patient has history of miscarriage x 2, laparotomy for ectopic on left and a vaginal delivery 10 years ago.  She had pain on the right and a positive pregnancy test.  She has not used contraception due to side effects with OCP.  She denies cramping or vaginal bleeding. This is an unplanned but welcomed pregnancy. Her previous pregnancies vaginal delivery at term x 1, ectopic on left with laparotomy, SAB x 2 during the first trimester spontaneous without surgery.     Past Medical History:   Diagnosis Date    Adjustment disorder with depressed mood 2024    Allergic 10/10    Migraines     Overweight        OB History    Para Term  AB Living   5 1 1  3 1   SAB IAB Ectopic Multiple Live Births   2  1  1      # Outcome Date GA Lbr Marcus/2nd Weight Sex Type Anes PTL Lv   5 Current            4 SAB            3 SAB            2 Ectopic            1 Term                 O:  Vitals:    25 1005   BP: 108/68   BP Location: Left arm   Patient Position: Sitting   Cuff Size: Standard   Weight: 60.8 kg (134 lb)        MRSA:no  Varicella:yes  STD hx:no    TVUS: Smooth gestational sac with reaction ring visualized in the uterus measuring about 0.78 cm also right ovarian cyst measuring about 3.8 cm, consistent with ED imaging     A/P:  #1. IUP possibly at 5 weeks no crown-rump length to confirm yet  - RTC in 2 weeks for repeat scan, slip for quant given to go today    Problem List Items Addressed This Visit    None  Visit Diagnoses         Amenorrhea    -  Primary    Relevant Orders    hCG, quantitative            OB/GYN  2025  10:36 AM

## 2025-02-02 LAB — HCG INTACT+B SERPL-ACNC: 9466 MIU/ML

## 2025-02-06 ENCOUNTER — ULTRASOUND (OUTPATIENT)
Dept: OBGYN CLINIC | Facility: CLINIC | Age: 40
End: 2025-02-06
Payer: COMMERCIAL

## 2025-02-06 ENCOUNTER — HOSPITAL ENCOUNTER (OUTPATIENT)
Dept: ULTRASOUND IMAGING | Facility: HOSPITAL | Age: 40
End: 2025-02-06
Payer: COMMERCIAL

## 2025-02-06 VITALS
DIASTOLIC BLOOD PRESSURE: 70 MMHG | SYSTOLIC BLOOD PRESSURE: 120 MMHG | BODY MASS INDEX: 26.53 KG/M2 | WEIGHT: 137 LBS | OXYGEN SATURATION: 99 % | HEART RATE: 84 BPM

## 2025-02-06 DIAGNOSIS — Z32.01 POSITIVE PREGNANCY TEST: ICD-10-CM

## 2025-02-06 DIAGNOSIS — Z36.89 ESTABLISH GESTATIONAL AGE, ULTRASOUND: ICD-10-CM

## 2025-02-06 DIAGNOSIS — Z87.59 HISTORY OF ECTOPIC PREGNANCY: ICD-10-CM

## 2025-02-06 DIAGNOSIS — O02.0 ANEMBRYONIC PREGNANCY: Primary | ICD-10-CM

## 2025-02-06 PROCEDURE — 76817 TRANSVAGINAL US OBSTETRIC: CPT | Performed by: NURSE PRACTITIONER

## 2025-02-06 PROCEDURE — 76815 OB US LIMITED FETUS(S): CPT

## 2025-02-06 PROCEDURE — 99213 OFFICE O/P EST LOW 20 MIN: CPT | Performed by: NURSE PRACTITIONER

## 2025-02-06 NOTE — PROGRESS NOTES
Viability and Dating Scan    Assessment    Celina Dixon is a 39 y.o.  here for dating and viability, accompanied by her spouse and daughter. Patient's last menstrual period was 2024.      Additional Findings: Empty gestational sac measuring 6w 1d.      LUDWIN Lozada  [facility] [address]  [facility] [address]  Dept: [phone]  Dept Fax: [phone]  Loc: [phone]  Loc Fax: [phone]     Plan    Diagnoses and all orders for this visit:    Anembryonic pregnancy  -     AMB US OB < 14 weeks single or first gestation level 1    Positive pregnancy test  -     hCG, quantitative; Future  -     Cancel: US pelvis complete w transvaginal; Future  -     Type and screen; Future    History of ectopic pregnancy  -     Cancel: US pelvis complete w transvaginal; Future  -     Type and screen; Future    Establish gestational age, ultrasound  -     AMB US OB < 14 weeks single or first gestation level 1      Exam findings reviewed with patient. Explained that this finding is consistent with early pregnancy loss. Given her history of ectopic pregnancy, I would like to obtained another pelvic US to confirm the findings and r/o heterotopic pregnancy.    Repeat HCG quant and type and screen ordered.      I explained that I will communicate with our on call providers since I am not in the office tomorrow, so they can review the US and labs and be in touch with her with further instructions.     All questions and concerns addressed and patient verbalized understanding.   ____________________________________________________________     Subjective    Celina Dixon is a 39 y.o.  with a . She has a hx of left ectopic pregnancy with salpingectomy and SAB x 2.  Denies experiencing an pain or vaginal bleeding.     # 1 - Date: None, Sex: None, Weight: None, GA: None, Type: None, Apgar1: None, Apgar5: None, Living:  None, Birth Comments: None    # 2 - Date: None, Sex: None, Weight: None, GA: None, Type: None, Apgar1: None, Apgar5: None, Living: None, Birth Comments: None    # 3 - Date: None, Sex: None, Weight: None, GA: None, Type: None, Apgar1: None, Apgar5: None, Living: None, Birth Comments: None    # 4 - Date: None, Sex: None, Weight: None, GA: None, Type: None, Apgar1: None, Apgar5: None, Living: None, Birth Comments: None    # 5 - Date: None, Sex: None, Weight: None, GA: None, Type: None, Apgar1: None, Apgar5: None, Living: None, Birth Comments: None      Past Medical History:   Diagnosis Date    Adjustment disorder with depressed mood 2024    Allergic 10/10    Migraines     Overweight        Past Surgical History:   Procedure Laterality Date     SECTION  2018    ECTOPIC PREGNANCY SURGERY  2019    with L fallopian tube removal    SALPINGECTOMY Left 2019    Dueto Ectopic Pregnancy       Social History     Socioeconomic History    Marital status: /Civil Union     Spouse name: Not on file    Number of children: 1    Years of education: Not on file    Highest education level: Not on file   Occupational History    Occupation: Clothing    Tobacco Use    Smoking status: Never     Passive exposure: Past    Smokeless tobacco: Never   Vaping Use    Vaping status: Never Used   Substance and Sexual Activity    Alcohol use: Not Currently     Comment: social    Drug use: Not Currently    Sexual activity: Yes     Partners: Male   Other Topics Concern    Not on file   Social History Narrative        Lives with  and 1 daughter    1 Child - 1 Daughter    Clothing      Social Drivers of Health     Financial Resource Strain: Not on file   Food Insecurity: Not on file   Transportation Needs: Not on file   Physical Activity: Not on file   Stress: Not on file   Social Connections: Not on file   Intimate Partner Violence: Not on file   Housing Stability: Not on file        Objective    Visit Vitals  /70   Pulse 84   Wt 62.1 kg (137 lb)   LMP 12/07/2024   SpO2 99%   BMI 26.53 kg/m²   OB Status Pregnant   Smoking Status Never   BSA 1.59 m²       OBGyn Exam    Ultrasound completed, please see Chart Review - Ob Procedures, Ultrasound Report for Interpretation.      Ultrasound Probe Disinfection    A transvaginal ultrasound was performed.   Prior to use, disinfection was performed with High Level Disinfection Process ([employer]yl[employer] [place])  Probe serial number [id number] #[id number]:  [id number] was used    LUDWIN Lozada  02/06/25  1:50 PM

## 2025-02-06 NOTE — Clinical Note
Good afternoon,   I saw Celina today for viability. She has an early pregnancy loss. I did sent her for another US given her hx of ectopic to r/o heterotopic pregnancy and another HCG level. Her last HCG was on 2/1/2025 - 9,466. I am off tomorrow, so I am going to attach you both to my inbox so you can see her results come through. Thank you both.   Have a great evening. Sky

## 2025-02-07 ENCOUNTER — TELEPHONE (OUTPATIENT)
Dept: OBGYN CLINIC | Facility: CLINIC | Age: 40
End: 2025-02-07

## 2025-02-07 ENCOUNTER — OFFICE VISIT (OUTPATIENT)
Dept: OBGYN CLINIC | Facility: CLINIC | Age: 40
End: 2025-02-07
Payer: COMMERCIAL

## 2025-02-07 VITALS
DIASTOLIC BLOOD PRESSURE: 68 MMHG | BODY MASS INDEX: 26.9 KG/M2 | WEIGHT: 137 LBS | SYSTOLIC BLOOD PRESSURE: 100 MMHG | HEIGHT: 60 IN

## 2025-02-07 DIAGNOSIS — O03.9 MISCARRIAGE: ICD-10-CM

## 2025-02-07 DIAGNOSIS — O03.9 EARLY PREGNANCY LOSS: ICD-10-CM

## 2025-02-07 DIAGNOSIS — O03.9 MISCARRIAGE: Primary | ICD-10-CM

## 2025-02-07 DIAGNOSIS — Z30.09 STERILIZATION EDUCATION: ICD-10-CM

## 2025-02-07 DIAGNOSIS — N96 RECURRENT PREGNANCY LOSS: ICD-10-CM

## 2025-02-07 PROCEDURE — 99215 OFFICE O/P EST HI 40 MIN: CPT | Performed by: STUDENT IN AN ORGANIZED HEALTH CARE EDUCATION/TRAINING PROGRAM

## 2025-02-07 PROCEDURE — S0190 MIFEPRISTONE, ORAL, 200 MG: HCPCS | Performed by: STUDENT IN AN ORGANIZED HEALTH CARE EDUCATION/TRAINING PROGRAM

## 2025-02-07 RX ORDER — MIFEPRISTONE 200 MG/1
200 TABLET ORAL ONCE
Status: COMPLETED | OUTPATIENT
Start: 2025-02-07 | End: 2025-02-07

## 2025-02-07 RX ORDER — IBUPROFEN 600 MG/1
600 TABLET, FILM COATED ORAL EVERY 6 HOURS PRN
Qty: 30 TABLET | Refills: 0 | Status: SHIPPED | OUTPATIENT
Start: 2025-02-07

## 2025-02-07 RX ORDER — ONDANSETRON 4 MG/1
4 TABLET, ORALLY DISINTEGRATING ORAL EVERY 6 HOURS PRN
Qty: 15 TABLET | Refills: 0 | Status: SHIPPED | OUTPATIENT
Start: 2025-02-07

## 2025-02-07 RX ORDER — MISOPROSTOL 200 UG/1
TABLET ORAL
Qty: 4 TABLET | Refills: 0 | Status: SHIPPED | OUTPATIENT
Start: 2025-02-07

## 2025-02-07 RX ADMIN — MIFEPRISTONE 200 MG: 200 TABLET ORAL at 16:25

## 2025-02-07 NOTE — PROGRESS NOTES
*Visit conducted in Turkish    Name: Celina Dixon      : 1985      MRN: [id number]  Encounter Provider: Julia Ward MD  Encounter Date: 2025   Encounter department: [facility]  :  Assessment & Plan  Miscarriage    Reviewed first US 25 showed gestational sac and two weeks after should have fetal pole with cardiac activity, but ultrasound yesterday still only shows gestational sac. Reviewed this is diagnostic of early pregnancy loss.  Discussed expectant management--usually within a couple of weeks will develop bleeding and cramping and pass pregnancy tissue, reviewed that may still need intervention, also risk of infection  Reviewed medical management with 2 protocols  (1) less effective misoprostol alone 800 mcg used either vaginally or buccally; about 67-84% effective, can do repeat dose if needed, still a chance of needing D&E  (2) more effective regimen of mifepristone (administered in office) on Monday and then misoprostol at home (about 80-90% effective)  Reviewed scheduling a D&E, which is the most effective, approximately 95% effective, a/w risk of injury to surrounding structures, scar tissue, etc.  Reviewed medical management associated with intense vaginal bleeding (should check Hgb) and cramping can have nausea and vomiting   All options should have follow-up with either bloodwork, ultrasounds, or both, and always the possibility of needing additional intervention, risk of infection, etc.  Prefers medical management, given mifepristone in office today after signing consent. Sent misoprostol to home pharmacy along with motrin and zofran for nausea.  Recommend CBC + HCG to help ongoing monitoring afterwards, ordered for her  Will need monitoring afterwards to confirm hcg decreases appropriately  RTO in 1-2 weeks  Provided the reproductive health access project handout in Turkish      Orders:    miFEPRIStone (Mifeprex) 200 mg    miSOPROStol  "(Cytotec) 200 mcg tablet; Place all 4 tablets in vagina OR place two in each each and then swallow after 30 minutes.    ondansetron (ZOFRAN-ODT) 4 mg disintegrating tablet; Take 1 tablet (4 mg total) by mouth every 6 (six) hours as needed for nausea or vomiting    ibuprofen (MOTRIN) 600 mg tablet; Take 1 tablet (600 mg total) by mouth every 6 (six) hours as needed for mild pain or moderate pain    CBC and Platelet; Future    hCG, quantitative; Future    Type and screen; Future    Early pregnancy loss    Orders:    miFEPRIStone (Mifeprex) 200 mg    miSOPROStol (Cytotec) 200 mcg tablet; Place all 4 tablets in vagina OR place two in each each and then swallow after 30 minutes.    ondansetron (ZOFRAN-ODT) 4 mg disintegrating tablet; Take 1 tablet (4 mg total) by mouth every 6 (six) hours as needed for nausea or vomiting    ibuprofen (MOTRIN) 600 mg tablet; Take 1 tablet (600 mg total) by mouth every 6 (six) hours as needed for mild pain or moderate pain    CBC and Platelet; Future    hCG, quantitative; Future    Type and screen; Future    Sterilization education  Indicates she is also terrified of another pregnancy given her history of losses and ruptured ectopic requiring ex-lap, blood transfusion, and hospital stay  Would like to have sterilization  Reviewed generally performed as laparoscopic bilateral salpingectomy  Will return to office for preop visit (may combine with follow-up for miscarriage if prefers and feels ready for that)  Private insurance, does not need state insurance sterilization consent         Recurrent pregnancy loss             History of Present Illness   HPI  Celina Dixon is a 39 y.o. female who presents for consultation    25 hc.6  25 US in ED: \"Probable intrauterine gestational sac measuring 5.3 x 6.3 x 7.4 mm most compatible with early intrauterine pregnancy. No yolk sac or embryo seen at this time. Follow-up with beta hCG and ultrasound as clinically warranted.   2.  " "3.7 cm simple right ovarian cyst.\"    25 US w/ Dr. John: small gestational sac 0.78cm, right ovarian cyst  25 hc  25 US w/ Isabell: empty gestational sac--> to ED  25 US in ED: \"possible intrauterine gestational sac measuring 12 mm, previously 6 mm, without yolk sac or embryo identified, concerning for failed pregnancy, given absence of embryo with heartbeat greater than 2 weeks after scan that showed a gestational sac without a yolk sac.\"    First pregnancy miscarriage had D&C  Second pregnancy had her daughter--, no complications  Third pregnancy had ectopic, ruptured  Fourth was miscarriage, spontaneous  This pregnancy was surprise, but once positive they were hopeful for a normal pregnancy, however, hasn't felt normal for her--no nausea, no vomiting    Hasn't had cramping or bleeding, but suspected something was abnormal given lack of normal progression    Never had evaluation for RPL  No family history of RPL or lupus  Mother had thyroid issues, but no losses    Celina has no family history of birth defetts  Partner has daughter with genetic syndrome     History obtained from: patient and patient's Significant Other    Review of Systems--per HPI  Pertinent Medical History   Past Medical History:   Diagnosis Date    Adjustment disorder with depressed mood 2024    Allergic 10/10    Melanoma (HCC)     Migraines     Overweight     Urticaria            Objective   /68 (BP Location: Left arm, Patient Position: Sitting)   Ht 5' 0.25\" (1.53 m)   Wt 62.1 kg (137 lb)   LMP 2024   BMI 26.53 kg/m²      Physical Exam  Eyes:      Extraocular Movements: Extraocular movements intact.      Conjunctiva/sclera: Conjunctivae normal.   Pulmonary:      Effort: Pulmonary effort is normal.   Musculoskeletal:         General: Normal range of motion.      Cervical back: Normal range of motion.   Neurological:      General: No focal deficit present.      Mental Status: She is alert. "   Psychiatric:         Mood and Affect: Mood normal.         Behavior: Behavior normal.         Thought Content: Thought content normal.         Administrative Statements   I have spent a total time of 45 minutes in caring for this patient on the day of the visit/encounter including Diagnostic results, Risks and benefits of tx options, Patient and family education, Impressions, Counseling / Coordination of care, Documenting in the medical record, Reviewing / ordering tests, medicine, procedures  , and Obtaining or reviewing history  .

## 2025-02-07 NOTE — LETTER
February 7, 2025     Patient: Celina Dixon  YOB: 1985  Date of Visit: 2/7/2025      To Whom it May Concern:    Celina Dixon is under my professional care. Celina was seen in my office on 2/7/2025. Celina may return to work on Tuesday, February 11, 2025 .    If you have any questions or concerns, please don't hesitate to call.         Sincerely,          Julia Ward MD        CC: No Recipients

## 2025-02-07 NOTE — TELEPHONE ENCOUNTER
LMOM for pt to to see if she could come into the [place] office today for an appointment at 9am with Dr. Hernandez. If pt is unable to make it, we will try to get her scheduled for an appointment in the office.

## 2025-02-08 ENCOUNTER — NURSE TRIAGE (OUTPATIENT)
Dept: OTHER | Facility: OTHER | Age: 40
End: 2025-02-08

## 2025-02-08 NOTE — TELEPHONE ENCOUNTER
Reason for Disposition  • Pharmacy with prescription refill question and triager answers question    Protocols used: Medication Refill and Renewal Call-Adult-

## 2025-02-08 NOTE — TELEPHONE ENCOUNTER
"Answer Assessment - Initial Assessment Questions  1. DRUG NAME: \"What medicine do you need to have refilled?\"        Mifeprex    Patient was advised by pharmacy that \"missing information from doctor\"    Protocols used: Medication Refill and Renewal Call-[facility]      On call provider clarified script with pharmacy.  Patient advised, instructions reviewed with patient and she verbalized understanding.  "

## 2025-02-08 NOTE — TELEPHONE ENCOUNTER
"Regarding: Medication Request Information Missing  ----- Message from Althea CLINE sent at 2/8/2025  9:30 AM EST -----  \"I went to  my prescription and it was not available because information was missing. I called yesterday but no one called me back.Can you please resend medication to pharmacy '    Medication Request Missing Information     miFEPRIStone (Mifeprex) 200 mg     Please send to  [facility]/pharmacy #[id number] - [place] [address]   [facility] [facility] [address]  Phone: [phone]  Fax: [phone]    "

## 2025-02-10 RX ORDER — MISOPROSTOL 200 UG/1
TABLET ORAL
Qty: 4 TABLET | Refills: 0 | OUTPATIENT
Start: 2025-02-10

## 2025-02-10 NOTE — TELEPHONE ENCOUNTER
Called to clarify  Was able to  on Saturday, then started bleeding  Yesterday some cramping  Today feeling much better--mild cramping, slight cramping in right side, not constant  No fever  Scant bleeding today  Went for bloodwork this AM around 9AM ([facility])    Should have results soon  Recommend follow-up appointment in 2 weeks  Reviewed calling sooner if any questions or concerns regarding heavier bleeding, more pain, fevers, etc.    CFW team--can we please schedule her 30-minute follow-up for miscarriage with ultrasound in 2 weeks? Ideally with [place]-speaking provider. If nothing available, will need outpatient ultrasound and separate follow-up appointment.    Thanks!

## 2025-02-24 ENCOUNTER — OFFICE VISIT (OUTPATIENT)
Dept: OBGYN CLINIC | Facility: CLINIC | Age: 40
End: 2025-02-24
Payer: COMMERCIAL

## 2025-02-24 ENCOUNTER — APPOINTMENT (OUTPATIENT)
Dept: LAB | Facility: HOSPITAL | Age: 40
End: 2025-02-24
Payer: COMMERCIAL

## 2025-02-24 VITALS — SYSTOLIC BLOOD PRESSURE: 100 MMHG | DIASTOLIC BLOOD PRESSURE: 60 MMHG | WEIGHT: 134 LBS | BODY MASS INDEX: 25.95 KG/M2

## 2025-02-24 DIAGNOSIS — Z13.6 SCREENING FOR CARDIOVASCULAR CONDITION: ICD-10-CM

## 2025-02-24 DIAGNOSIS — O03.9 MISCARRIAGE: Primary | ICD-10-CM

## 2025-02-24 DIAGNOSIS — O03.9 MISCARRIAGE: ICD-10-CM

## 2025-02-24 DIAGNOSIS — O03.9 EARLY PREGNANCY LOSS: ICD-10-CM

## 2025-02-24 LAB
ABO GROUP BLD: NORMAL
B-HCG SERPL-ACNC: 15.2 MIU/ML (ref 0–5)
BLD GP AB SCN SERPL QL: NEGATIVE
ERYTHROCYTE [DISTWIDTH] IN BLOOD BY AUTOMATED COUNT: 12.9 % (ref 11.6–15.1)
HCT VFR BLD AUTO: 38.5 % (ref 34.8–46.1)
HGB BLD-MCNC: 12.2 G/DL (ref 11.5–15.4)
MCH RBC QN AUTO: 27.7 PG (ref 26.8–34.3)
MCHC RBC AUTO-ENTMCNC: 31.7 G/DL (ref 31.4–37.4)
MCV RBC AUTO: 87 FL (ref 82–98)
PLATELET # BLD AUTO: 198 THOUSANDS/UL (ref 149–390)
PMV BLD AUTO: 11.9 FL (ref 8.9–12.7)
RBC # BLD AUTO: 4.41 MILLION/UL (ref 3.81–5.12)
RH BLD: POSITIVE
SPECIMEN EXPIRATION DATE: NORMAL
WBC # BLD AUTO: 4.85 THOUSAND/UL (ref 4.31–10.16)

## 2025-02-24 PROCEDURE — 99213 OFFICE O/P EST LOW 20 MIN: CPT | Performed by: NURSE PRACTITIONER

## 2025-02-24 PROCEDURE — 86901 BLOOD TYPING SEROLOGIC RH(D): CPT

## 2025-02-24 PROCEDURE — 84702 CHORIONIC GONADOTROPIN TEST: CPT

## 2025-02-24 PROCEDURE — 85027 COMPLETE CBC AUTOMATED: CPT

## 2025-02-24 PROCEDURE — 86900 BLOOD TYPING SEROLOGIC ABO: CPT

## 2025-02-24 PROCEDURE — 36415 COLL VENOUS BLD VENIPUNCTURE: CPT

## 2025-02-24 PROCEDURE — 86850 RBC ANTIBODY SCREEN: CPT

## 2025-02-24 NOTE — LETTER
February 24, 2025     Patient: Celina Dixon  YOB: 1985  Date of Visit: 2/24/2025      To Whom it May Concern:    Celina Dixon is under my professional care. Celina was seen in my office on 2/24/2025. Celina may return to work on 02/25/2025 .    I/f you have any questions or concerns, please don't hesitate to call.         Sincerely,          LUDWIN Lozada        CC: No Recipients  
Labs/Imaging Studies

## 2025-02-24 NOTE — PROGRESS NOTES
Subjective      Celina Dixon is a 39 y.o. female. Celina is here for follow-up of miscarriage, managed with mifepristone 200 mg on 2/7/25 and misoprostol 800 mg 2/8/25. She started bleeding on 2/8/25 - heavy bleeding with clots. Bleeding persisted into the 9th. By 2/10 she was staining. She has been noticing brown discharge now.     Blood type:  unknown  - labs pending.    Last HCG on 2/1/2025 - 9,466.    Results from last 7 days   Lab Units 02/24/25  1234   HCG QUANTITATIVE mIU/mL 15.2*         The following portions of the patient's history were reviewed and updated as appropriate: allergies, current medications, past family history, past medical history, past social history, past surgical history, and problem list.    Review of Systems  Pertinent items are noted in HPI.     Objective      /60   Wt 60.8 kg (134 lb)   LMP 12/07/2024   BMI 25.95 kg/m²     Physical Exam  Constitutional:       Appearance: Normal appearance.   Genitourinary:      Right Labia: No rash, tenderness, lesions, skin changes or Bartholin's cyst.     Left Labia: No tenderness, lesions, skin changes, Bartholin's cyst or rash.     No labial fusion noted.      Vaginal discharge (scant brown discharge) present.      No vaginal erythema, tenderness, bleeding or granulation tissue.      No vaginal prolapse present.     No vaginal atrophy present.     Cervix is parous.      No cervical discharge, lesion, polyp or nabothian cyst.   HENT:      Head: Normocephalic and atraumatic.   Cardiovascular:      Rate and Rhythm: Normal rate.   Pulmonary:      Effort: Pulmonary effort is normal.   Musculoskeletal:      Cervical back: Neck supple.   Neurological:      Mental Status: She is alert.   Psychiatric:         Behavior: Behavior is cooperative.   Vitals and nursing note reviewed.         Imaging    Limited office ultrasound:  No POC noted. Endometrial lining measuring 1.30 cm.    Assessment/Plan    Celina was seen today for  miscarriage.    Diagnoses and all orders for this visit:    Miscarriage  -     Cancel: hCG, quantitative; Future  -     hCG, quantitative; Future      Repeat HCG level in one week. Goal is <5 mIU/ml.    Follow-up for sterilization consult with Dr. Ward